# Patient Record
Sex: FEMALE | Race: ASIAN | NOT HISPANIC OR LATINO | URBAN - METROPOLITAN AREA
[De-identification: names, ages, dates, MRNs, and addresses within clinical notes are randomized per-mention and may not be internally consistent; named-entity substitution may affect disease eponyms.]

---

## 2022-11-01 ENCOUNTER — INPATIENT (INPATIENT)
Facility: HOSPITAL | Age: 72
LOS: 1 days | Discharge: HOME | DRG: 149 | End: 2022-11-03
Attending: INTERNAL MEDICINE | Admitting: INTERNAL MEDICINE
Payer: COMMERCIAL

## 2022-11-01 VITALS
TEMPERATURE: 98 F | RESPIRATION RATE: 19 BRPM | WEIGHT: 119.93 LBS | OXYGEN SATURATION: 98 % | HEIGHT: 63 IN | HEART RATE: 71 BPM | SYSTOLIC BLOOD PRESSURE: 140 MMHG | DIASTOLIC BLOOD PRESSURE: 83 MMHG

## 2022-11-01 DIAGNOSIS — Z90.49 ACQUIRED ABSENCE OF OTHER SPECIFIED PARTS OF DIGESTIVE TRACT: Chronic | ICD-10-CM

## 2022-11-01 LAB
ALBUMIN SERPL ELPH-MCNC: 4.5 G/DL — SIGNIFICANT CHANGE UP (ref 3.3–5)
ALP SERPL-CCNC: 87 U/L — SIGNIFICANT CHANGE UP (ref 40–120)
ALT FLD-CCNC: 12 U/L — SIGNIFICANT CHANGE UP (ref 10–45)
ANION GAP SERPL CALC-SCNC: 10 MMOL/L — SIGNIFICANT CHANGE UP (ref 5–17)
APPEARANCE UR: CLEAR — SIGNIFICANT CHANGE UP
APTT BLD: 28.6 SEC — SIGNIFICANT CHANGE UP (ref 27.5–35.5)
AST SERPL-CCNC: 19 U/L — SIGNIFICANT CHANGE UP (ref 10–40)
BACTERIA # UR AUTO: NEGATIVE — SIGNIFICANT CHANGE UP
BASOPHILS # BLD AUTO: 0.03 K/UL — SIGNIFICANT CHANGE UP (ref 0–0.2)
BASOPHILS NFR BLD AUTO: 0.6 % — SIGNIFICANT CHANGE UP (ref 0–2)
BILIRUB SERPL-MCNC: 0.4 MG/DL — SIGNIFICANT CHANGE UP (ref 0.2–1.2)
BILIRUB UR-MCNC: NEGATIVE — SIGNIFICANT CHANGE UP
BUN SERPL-MCNC: 14 MG/DL — SIGNIFICANT CHANGE UP (ref 7–23)
CALCIUM SERPL-MCNC: 9.7 MG/DL — SIGNIFICANT CHANGE UP (ref 8.4–10.5)
CHLORIDE SERPL-SCNC: 105 MMOL/L — SIGNIFICANT CHANGE UP (ref 96–108)
CO2 SERPL-SCNC: 28 MMOL/L — SIGNIFICANT CHANGE UP (ref 22–31)
COLOR SPEC: COLORLESS — SIGNIFICANT CHANGE UP
CREAT SERPL-MCNC: 0.6 MG/DL — SIGNIFICANT CHANGE UP (ref 0.5–1.3)
DIFF PNL FLD: NEGATIVE — SIGNIFICANT CHANGE UP
EGFR: 96 ML/MIN/1.73M2 — SIGNIFICANT CHANGE UP
EOSINOPHIL # BLD AUTO: 0.05 K/UL — SIGNIFICANT CHANGE UP (ref 0–0.5)
EOSINOPHIL NFR BLD AUTO: 1 % — SIGNIFICANT CHANGE UP (ref 0–6)
EPI CELLS # UR: 0 /HPF — SIGNIFICANT CHANGE UP
FLUAV AG NPH QL: SIGNIFICANT CHANGE UP
FLUBV AG NPH QL: SIGNIFICANT CHANGE UP
GLUCOSE SERPL-MCNC: 98 MG/DL — SIGNIFICANT CHANGE UP (ref 70–99)
GLUCOSE UR QL: NEGATIVE — SIGNIFICANT CHANGE UP
HCT VFR BLD CALC: 40.1 % — SIGNIFICANT CHANGE UP (ref 34.5–45)
HGB BLD-MCNC: 12.7 G/DL — SIGNIFICANT CHANGE UP (ref 11.5–15.5)
HYALINE CASTS # UR AUTO: 0 /LPF — SIGNIFICANT CHANGE UP (ref 0–2)
IMM GRANULOCYTES NFR BLD AUTO: 0.4 % — SIGNIFICANT CHANGE UP (ref 0–0.9)
INR BLD: 0.97 RATIO — SIGNIFICANT CHANGE UP (ref 0.88–1.16)
KETONES UR-MCNC: NEGATIVE — SIGNIFICANT CHANGE UP
LEUKOCYTE ESTERASE UR-ACNC: NEGATIVE — SIGNIFICANT CHANGE UP
LYMPHOCYTES # BLD AUTO: 1.11 K/UL — SIGNIFICANT CHANGE UP (ref 1–3.3)
LYMPHOCYTES # BLD AUTO: 22.6 % — SIGNIFICANT CHANGE UP (ref 13–44)
MCHC RBC-ENTMCNC: 31.4 PG — SIGNIFICANT CHANGE UP (ref 27–34)
MCHC RBC-ENTMCNC: 31.7 GM/DL — LOW (ref 32–36)
MCV RBC AUTO: 99.3 FL — SIGNIFICANT CHANGE UP (ref 80–100)
MONOCYTES # BLD AUTO: 0.34 K/UL — SIGNIFICANT CHANGE UP (ref 0–0.9)
MONOCYTES NFR BLD AUTO: 6.9 % — SIGNIFICANT CHANGE UP (ref 2–14)
NEUTROPHILS # BLD AUTO: 3.36 K/UL — SIGNIFICANT CHANGE UP (ref 1.8–7.4)
NEUTROPHILS NFR BLD AUTO: 68.5 % — SIGNIFICANT CHANGE UP (ref 43–77)
NITRITE UR-MCNC: NEGATIVE — SIGNIFICANT CHANGE UP
NRBC # BLD: 0 /100 WBCS — SIGNIFICANT CHANGE UP (ref 0–0)
PH UR: 7 — SIGNIFICANT CHANGE UP (ref 5–8)
PLATELET # BLD AUTO: 135 K/UL — LOW (ref 150–400)
POTASSIUM SERPL-MCNC: 4.7 MMOL/L — SIGNIFICANT CHANGE UP (ref 3.5–5.3)
POTASSIUM SERPL-SCNC: 4.7 MMOL/L — SIGNIFICANT CHANGE UP (ref 3.5–5.3)
PROT SERPL-MCNC: 7.4 G/DL — SIGNIFICANT CHANGE UP (ref 6–8.3)
PROT UR-MCNC: NEGATIVE — SIGNIFICANT CHANGE UP
PROTHROM AB SERPL-ACNC: 11.2 SEC — SIGNIFICANT CHANGE UP (ref 10.5–13.4)
RBC # BLD: 4.04 M/UL — SIGNIFICANT CHANGE UP (ref 3.8–5.2)
RBC # FLD: 12.5 % — SIGNIFICANT CHANGE UP (ref 10.3–14.5)
RBC CASTS # UR COMP ASSIST: 2 /HPF — SIGNIFICANT CHANGE UP (ref 0–4)
RSV RNA NPH QL NAA+NON-PROBE: SIGNIFICANT CHANGE UP
SARS-COV-2 RNA SPEC QL NAA+PROBE: SIGNIFICANT CHANGE UP
SODIUM SERPL-SCNC: 143 MMOL/L — SIGNIFICANT CHANGE UP (ref 135–145)
SP GR SPEC: 1.04 — HIGH (ref 1.01–1.02)
TROPONIN T, HIGH SENSITIVITY RESULT: 11 NG/L — SIGNIFICANT CHANGE UP (ref 0–51)
UROBILINOGEN FLD QL: NEGATIVE — SIGNIFICANT CHANGE UP
WBC # BLD: 4.91 K/UL — SIGNIFICANT CHANGE UP (ref 3.8–10.5)
WBC # FLD AUTO: 4.91 K/UL — SIGNIFICANT CHANGE UP (ref 3.8–10.5)
WBC UR QL: 0 /HPF — SIGNIFICANT CHANGE UP (ref 0–5)

## 2022-11-01 PROCEDURE — 70496 CT ANGIOGRAPHY HEAD: CPT | Mod: 26,MA

## 2022-11-01 PROCEDURE — 99220: CPT

## 2022-11-01 PROCEDURE — 70498 CT ANGIOGRAPHY NECK: CPT | Mod: 26,MA

## 2022-11-01 PROCEDURE — 70553 MRI BRAIN STEM W/O & W/DYE: CPT | Mod: 26,MC

## 2022-11-01 PROCEDURE — 93010 ELECTROCARDIOGRAM REPORT: CPT

## 2022-11-01 PROCEDURE — 70546 MR ANGIOGRAPH HEAD W/O&W/DYE: CPT | Mod: 26,59,MC

## 2022-11-01 RX ORDER — METOCLOPRAMIDE HCL 10 MG
10 TABLET ORAL ONCE
Refills: 0 | Status: COMPLETED | OUTPATIENT
Start: 2022-11-01 | End: 2022-11-01

## 2022-11-01 RX ORDER — SODIUM CHLORIDE 9 MG/ML
1000 INJECTION INTRAMUSCULAR; INTRAVENOUS; SUBCUTANEOUS ONCE
Refills: 0 | Status: COMPLETED | OUTPATIENT
Start: 2022-11-01 | End: 2022-11-01

## 2022-11-01 RX ORDER — PANTOPRAZOLE SODIUM 20 MG/1
40 TABLET, DELAYED RELEASE ORAL ONCE
Refills: 0 | Status: COMPLETED | OUTPATIENT
Start: 2022-11-01 | End: 2022-11-01

## 2022-11-01 RX ORDER — DIAZEPAM 5 MG
5 TABLET ORAL ONCE
Refills: 0 | Status: DISCONTINUED | OUTPATIENT
Start: 2022-11-01 | End: 2022-11-01

## 2022-11-01 RX ORDER — ONDANSETRON 8 MG/1
4 TABLET, FILM COATED ORAL ONCE
Refills: 0 | Status: COMPLETED | OUTPATIENT
Start: 2022-11-01 | End: 2022-11-01

## 2022-11-01 RX ORDER — MECLIZINE HCL 12.5 MG
25 TABLET ORAL EVERY 6 HOURS
Refills: 0 | Status: DISCONTINUED | OUTPATIENT
Start: 2022-11-01 | End: 2022-11-03

## 2022-11-01 RX ORDER — LEVOTHYROXINE SODIUM 125 MCG
75 TABLET ORAL DAILY
Refills: 0 | Status: DISCONTINUED | OUTPATIENT
Start: 2022-11-01 | End: 2022-11-03

## 2022-11-01 RX ADMIN — Medication 10 MILLIGRAM(S): at 08:01

## 2022-11-01 RX ADMIN — PANTOPRAZOLE SODIUM 40 MILLIGRAM(S): 20 TABLET, DELAYED RELEASE ORAL at 23:44

## 2022-11-01 RX ADMIN — Medication 25 MILLIGRAM(S): at 23:44

## 2022-11-01 RX ADMIN — ONDANSETRON 4 MILLIGRAM(S): 8 TABLET, FILM COATED ORAL at 09:56

## 2022-11-01 RX ADMIN — SODIUM CHLORIDE 1000 MILLILITER(S): 9 INJECTION INTRAMUSCULAR; INTRAVENOUS; SUBCUTANEOUS at 11:15

## 2022-11-01 RX ADMIN — Medication 30 MILLILITER(S): at 23:44

## 2022-11-01 RX ADMIN — SODIUM CHLORIDE 1000 MILLILITER(S): 9 INJECTION INTRAMUSCULAR; INTRAVENOUS; SUBCUTANEOUS at 12:22

## 2022-11-01 RX ADMIN — Medication 5 MILLIGRAM(S): at 10:01

## 2022-11-01 NOTE — ED PROVIDER NOTE - OBJECTIVE STATEMENT
71yo F pmhx of asthma, gastric ulcer, hypothyroidism, vertigo comes to ED w/ dizziness. From home felt dizzy fell to floor around 645am, patient lost consciousness, patient unsure how long she was down,  found her and called 911. Their pain/symptom is moderate, constant, mediating with rest, worse with standing up, associated with L arm coordination changes, drooling from L side of mouth, and L face numbness, initially had milder versions of symptoms since 10/25 went to the hospital previously for symptoms and was sceduled for an outpatient MRI to evaluate for stroke which she has not done yet but worsened today prompting visit to ED. Started randomly.     [id number] Yara ([place]ian)

## 2022-11-01 NOTE — CONSULT NOTE ADULT - ATTENDING COMMENTS
seen in ED  Briefly    72y R-H woman with  asthma , BPPV and hypothyroidism presented with ten days of room spinning dizziness.    CTH no infarct  CTA H/N question of venous thrombosis  MRI/V unremarkable. venous system and arterial system unremarkable.  R mastoid effusion noted     IMPRESSION   vertigo 2/2 R mastoid effusion      RECOMMENDATION  - mecizline PRN  - ENT, may need abx?  - no further stroke workup needed  - vestibular therapy  - outpatient f/u in office    - check FS, glucose control <180  - GI/DVT ppx  - Counseling on diet, exercise, and medication adherence was done  - Counseling on smoking cessation and alcohol consumption offered when appropriate.  - Pain assessed and judicious use of narcotics when appropriate was discussed.    - Stroke education given when appropriate.  - Importance of fall prevention discussed.   - Differential diagnosis and plan of care discussed with patient and/or family and primary team  - Thank you for allowing me to participate in the care of this patient. Call with questions.   - spoke with daughter and CDU team  Lauro Story MD  Vascular Neurology  Office: [phone] seen in ED  Briefly    72y R-H woman with  asthma , BPPV and hypothyroidism presented with ten days of room spinning dizziness.    CTH no infarct  CTA H/N question of venous thrombosis  MRI/V unremarkable. venous system and arterial system unremarkable.  R mastoid effusion noted     IMPRESSION   vertigo 2/2 R mastoid effusion      RECOMMENDATION  - mecizline PRN  - ENT, may need abx?  - no further stroke workup needed  - vestibular therapy, PT  - outpatient f/u in office    - check FS, glucose control <180  - GI/DVT ppx  - Counseling on diet, exercise, and medication adherence was done  - Counseling on smoking cessation and alcohol consumption offered when appropriate.  - Pain assessed and judicious use of narcotics when appropriate was discussed.    - Stroke education given when appropriate.  - Importance of fall prevention discussed.   - Differential diagnosis and plan of care discussed with patient and/or family and primary team  - Thank you for allowing me to participate in the care of this patient. Call with questions.   - spoke with daughter and CDU team  Lauro Story MD  Vascular Neurology  Office: [phone]

## 2022-11-01 NOTE — CONSULT NOTE ADULT - ASSESSMENT
ASSESSMENT     IMPRESSION     RECOMMENDATION  ASSESSMENT     72y R-H woman with a PMHx significant for asthma and hypothyroidism presented with ten days of room spinning dizziness. Due to suspicion of stroke was scheduled for an MRI of her head outpatient but experienced another bout of room spinning dizziness this morning and henceforth was BIBEMS. CT scan demonstrating nonenhancement of superior sagittal sinus and enlarged inferior sagittal sinus. Neurology consulted for stroke workup.     IMPRESSION   Left sided ataxic-hemiparesis with concurrent room spinning dizziness localizing to right brain dysfunction etiology can be broad such as central or peripheral vertigo vs ischemic stroke of unknown mechanism      RECOMMENDATION  [] CDU   #Stroke rule out   [] HgbA1C, fasting lipid panel, CBC, CMP, coag panel, troponin  [] MRI brain w/o con, MRv head/neck w/o contrast  [] TTE w/ bubble study  [] telemetry to check for arrhythmia, EKG, will discuss loop recorder    - Tight glucose control (long-term goal HgbA1c < 6%)  - Stroke education and counseling  - Neuro-checks and VS q4h  - Permissive HTN up to 220/120 for 24-48h from symptom onset  - Dysphagia screen. If fails, speech/swallow eval  - aspiration, fall precautions  - STAT CT head non-contrast for change in neuro exam.   - PT/ OT / DVT ppx per primary team     #Vertigo  [x] f/u CT/CTA   [] BP measurements in both arms + orthostatic VS  [] consider IVFs  [] Meclizine 12.5mg BID PRN (at most increase to 25mg Q8)  [] Refer for Vestibular Rehab on discharge      Discussed with stroke fellow Dimas Ramirez and under supervision of attending Alannah Coe regarding decision against candidacy for tPA/ thrombectomy. Will be formally staffed on morning rounds with attending. Recommendations will be complete once signed by attending.  ASSESSMENT     72y R-H woman with a PMHx significant for asthma and hypothyroidism presented with ten days of room spinning dizziness. Due to suspicion of stroke was scheduled for an MRI of her head outpatient but experienced another bout of room spinning dizziness this morning and henceforth was BIBEMS. CT scan demonstrating nonenhancement of superior sagittal sinus and enlarged inferior sagittal sinus. Neurology consulted for stroke workup.     IMPRESSION   Left sided ataxic-hemiparesis with concurrent room spinning dizziness localizing to right brain dysfunction etiology can be broad such as central or peripheral vertigo vs ischemic stroke of unknown mechanism      RECOMMENDATION  [] CDU   #Stroke rule out   [] HgbA1C, fasting lipid panel, CBC, CMP, coag panel, troponin  [] Gadolinium-enhanced MR head w/wo contrast /MRV w/wo contrast     #Vertigo  [x] f/u CT/CTA   [] BP measurements in both arms + orthostatic VS  [] consider IVFs  [] Meclizine 12.5mg BID PRN (at most increase to 25mg Q8)  [] Refer for Vestibular Rehab on discharge    Discussed with stroke fellow Dimas Ramirez and under supervision of attending Alannah Coe regarding decision against candidacy for tPA/ thrombectomy. Will be formally staffed on morning rounds with attending. Recommendations will be complete once signed by attending.

## 2022-11-01 NOTE — ED CDU PROVIDER INITIAL DAY NOTE - PROGRESS NOTE DETAILS
discussed CT findings of mastoid effusion and ?otitis w/ attending Dr. Kulkarni, would not treat at this time. TMs do not appear infectious on exam. - CARLO FariaC Patient seen at bedside in NAD.  VSS.  Patient resting comfortably without complaints. No events on tele. No interval changes from previous CDU note. Exam still w/ 4+/5 strength LUE/LLE compared to right. Faint L sided facial droop. Denies new sxs. Awaiting MRI. - Dami Whitehead PA-C

## 2022-11-01 NOTE — ED ADULT NURSE REASSESSMENT NOTE - NS ED NURSE REASSESS COMMENT FT1
16.00 Received the Pt from  TASHI Cortes. Pt is Observed for Dizziness for MRI  . Received the Pt A&OX 4 obeys commands Juanita N/V/D fever chills cp SOB   Comfort care & safety measures continued  IV site looks clean & dry no signs of infiltration noted pt denies  pain IV site .  Pt is advised to call for help  call bell with in the reach pt verbalized the understanding .  pending CDU  MD troy . GCS 15/15 A&OX 4 PERRLA  size 3 Strong upper & lower extremities steady gait   No facial droop  No Hand Leg drop denies numbness tingling Continue to monitor

## 2022-11-01 NOTE — CONSULT NOTE ADULT - SUBJECTIVE AND OBJECTIVE BOX
Neurology - Consult Note    -  Spectra: [phone] ([facility]), [phone] ([facility])  -    HPI: Patient JOSE RAFAEL PADNA is a 72y (1950) wo/man with a PMHx significant for ***      Review of Systems:  INCOMPLETE   CONSTITUTIONAL: No fevers or chills  EYES AND ENT: No visual changes or no throat pain   NECK: No pain or stiffness  RESPIRATORY: No hemoptysis or shortness of breath  CARDIOVASCULAR: No chest pain or palpitations  GASTROINTESTINAL: No melena or hematochezia  GENITOURINARY: No dysuria or hematuria  NEUROLOGICAL: +As stated in HPI above  SKIN: No itching, burning, rashes, or lesions   All other review of systems is negative unless indicated above.    Allergies:      PMHx/PSHx/Family Hx: As above, otherwise see below   Hypothyroidism        Social Hx:  No current use of tobacco, alcohol, or illicit drugs  Lives with ***    Medications:  MEDICATIONS  (STANDING):    MEDICATIONS  (PRN):      Vitals:  T(C): 36.4 (11-01-22 @ 11:00), Max: 36.7 (11-01-22 @ 07:30)  HR: 64 (11-01-22 @ 11:23) (64 - 71)  BP: 94/58 (11-01-22 @ 11:23) (89/62 - 140/83)  RR: 16 (11-01-22 @ 11:23) (16 - 19)  SpO2: 95% (11-01-22 @ 11:23) (95% - 98%)    Physical Examination: INCOMPLETE  General - NAD, pleasant, cooperative   Cardiovascular - Peripheral pulses palpable, no edema  Neurologic Exam:  Mental status - Awake, Alert, Oriented to person, place, and time. Speech fluent, repetition and naming intact. Follows simple and complex commands. Attention/concentration, recent and remote memory (including registration and recall), and fund of knowledge intact    Cranial nerves:  CN II: Visual fields are full to confrontation. Fundoscopic exam is normal with sharp discs. Pupils are 4 mm and briskly reactive to light. Visual acuity is 20/20 bilaterally.  CN III, IV, VI: EOMI, no nystagmus, no ptosis  CN V: Facial sensation is intact to pinprick in all 3 divisions bilaterally.  CN VII: Face is symmetric with normal eye closure and smile.  CN VII: Hearing is normal to rubbing fingers  CN IX, X: Palate elevates symmetrically. Phonation is normal.  CN XI: Head turning and shoulder shrug are intact  CN XII: Tongue is midline with normal movements and no atrophy.    Motor - Normal bulk and tone throughout. No pronator drift of out-stretched arms.  Strength testing            Deltoid      Biceps      Triceps     Wrist Extension    Wrist Flexion     Interossei         R            5                 5               5                     5                              5                        5                 5  L             5                 5               5                     5                              5                        5                 5              Hip Flexion    Hip Extension    Knee Flexion    Knee Extension    Dorsiflexion    Plantar Flexion  R              5                           5                       5                           5                            5                          5  L              5                           5                        5                           5                            5                          5    Sensation - Light touch/temperature OR pain/vibration intact in fingers and toes     DTR's -             Biceps      Triceps     Brachioradialis      Patellar    Ankle    Toes/plantar response  R             2+             2+                  2+                       2+            2+                 Down  L              2+             2+                 2+                        2+           2+                 Down    Coordination - Rapid alternating movements and fine finger movements are intact. There is no dysmetria on finger-to-nose and heel-knee-shin. There are no abnormal or extraneous movements. Romberg?    Gait and station - Posture is normal. Gait is steady with normal steps, base, arm swing, and turning. Heel and toe walking are normal. Tandem gait is normal     Labs:                        12.7   4.91  )-----------( 135      ( 01 Nov 2022 08:27 )             40.1     11-01    143  |  105  |  14  ----------------------------<  98  4.7   |  28  |  0.60    Ca    9.7      01 Nov 2022 08:27    TPro  7.4  /  Alb  4.5  /  TBili  0.4  /  DBili  x   /  AST  19  /  ALT  12  /  AlkPhos  87  11-01    CAPILLARY BLOOD GLUCOSE      POCT Blood Glucose.: 90 mg/dL (01 Nov 2022 11:20)    LIVER FUNCTIONS - ( 01 Nov 2022 08:27 )  Alb: 4.5 g/dL / Pro: 7.4 g/dL / ALK PHOS: 87 U/L / ALT: 12 U/L / AST: 19 U/L / GGT: x             PT/INR - ( 01 Nov 2022 08:27 )   PT: 11.2 sec;   INR: 0.97 ratio         PTT - ( 01 Nov 2022 08:27 )  PTT:28.6 sec  CSF:                  Radiology:  CT Head No Cont:  (01 Nov 2022 08:54)     Neurology - Consult Note    -  Spectra: [phone] ([facility]), [phone] ([facility])  -    HPI: Patient JOSE RAFAEL PANDA is a R-H 72y (1950) woman with a PMHx significant for asthma and hypothyroidism presented with ten days of room spinning dizziness. She awoke with dizziness and went to a [facility] for treatment. She was given meclizine but after two days, did not have any relief of her dizziness. 5 days ago, she went back to Urgent care and was told that she may have had a stroke. She was recommended to schedule an MRI of her head outpatient but experienced another bout of room spinning dizziness this morning and henceforth was BIBEMS.     In describing the dizziness, the patient said that they were more intense when she were to lie down and would be associated with nausea and vomiting. If she were to be upright and sitting, the symptoms would go away. While the symptoms would be on and off, she believes that the dizziness is more intense at night. She started getting a mild headache two days ago localized in the left side of her head. She also said that a few days ago, she experienced water leaking out of the left side of the mouth and feels weaker on the left side of her body, particularly when she is clenching her fist. Had a history of gallbladder removal and she takes synthroid for "low blood pressure." Normally walks for 30 min a day. Denies recent close contacts, travel.      NIHSS: 3   mRS: 0  No tPA indicated because outside tPA window   No thrombectomy indicated because no LVO found     Review of Systems:    RESPIRATORY: No shortness of breath  CARDS: No chest palpitations   NEUROLOGICAL: +As stated in HPI above  All other review of systems is negative unless indicated above.    Allergies:      PMHx/PSHx/Family Hx: As above, otherwise see below   Hypothyroidism        Social Hx:  No current use of tobacco, alcohol, or illicit drugs      Medications:  MEDICATIONS  (STANDING):    MEDICATIONS  (PRN):      Vitals:  T(C): 36.4 (11-01-22 @ 11:00), Max: 36.7 (11-01-22 @ 07:30)  HR: 64 (11-01-22 @ 11:23) (64 - 71)  BP: 94/58 (11-01-22 @ 11:23) (89/62 - 140/83)  RR: 16 (11-01-22 @ 11:23) (16 - 19)  SpO2: 95% (11-01-22 @ 11:23) (95% - 98%)    Physical Examination:   General - NAD, pleasant, cooperative   Cardiovascular - Peripheral pulses palpable, no edema  Neurologic Exam:  Mental status - Awake, Alert, Oriented to person, place, and time. Speech fluent, repetition and naming intact. Follows simple and complex commands. Attention/concentration, recent and remote memory (including registration and recall), and fund of knowledge intact    Cranial nerves:  CN II: Visual fields are full to confrontation. Pupils are 3mm and briskly reactive to light.   CN III, IV, VI: EOMI, no nystagmus, no ptosis, negative test of skew, positive head impulse    CN V: Facial sensation is intact to pinprick in all 3 divisions bilaterally.  CN VII: Mild left facial droop upon smile   CN VII: Hearing is normal to rubbing fingers  CN IX, X: Palate elevates symmetrically. Phonation is normal.  CN XI: Head turning and shoulder shrug are intact  CN XII: Tongue is midline with normal movements and no atrophy.    Motor - Normal bulk and tone throughout. No pronator drift of out-stretched arms.  Strength testing            Deltoid      Biceps      Triceps     Wrist Extension    Wrist Flexion     Interossei         R            5                 5               5                     5                              5                        5                 5  L             4+               4+            4-                    4-                            4-                         5                 4+              Hip Flexion    Hip Extension    Knee Flexion    Knee Extension    Dorsiflexion    Plantar Flexion  R              5                        -                         4+                           4+                            5                          5  L              4+                      -                          4+                           4-                           4+                          4+    Sensation - Light touch intact in fingers and toes     DTR's -             Biceps      Triceps     Brachioradialis      Patellar    Ankle    Toes/plantar response  R             2+             2+                  2+                       2+            2+                 Down  L              2+             2+                 2+                        2+           2+                 Down    Coordination - There is no dysmetria on finger-to-nose on right but with dysmetria on the left. There are no abnormal or extraneous movements. Positive romberg     Gait and station - Cautious wide based gait with small stride length with retropulsion    Labs:                        12.7   4.91  )-----------( 135      ( 01 Nov 2022 08:27 )             40.1     11-01    143  |  105  |  14  ----------------------------<  98  4.7   |  28  |  0.60    Ca    9.7      01 Nov 2022 08:27    TPro  7.4  /  Alb  4.5  /  TBili  0.4  /  DBili  x   /  AST  19  /  ALT  12  /  AlkPhos  87  11-01    CAPILLARY BLOOD GLUCOSE      POCT Blood Glucose.: 90 mg/dL (01 Nov 2022 11:20)    LIVER FUNCTIONS - ( 01 Nov 2022 08:27 )  Alb: 4.5 g/dL / Pro: 7.4 g/dL / ALK PHOS: 87 U/L / ALT: 12 U/L / AST: 19 U/L / GGT: x             PT/INR - ( 01 Nov 2022 08:27 )   PT: 11.2 sec;   INR: 0.97 ratio         PTT - ( 01 Nov 2022 08:27 )  PTT:28.6 sec  CSF:                  Radiology:  < from: CT Head No Cont (11.01.22 @ 08:54) >    1.  Brain:  Unremarkable CT appearance of the brain. Right mastoid   effusion and suspected otitis. Underdeveloped right petrous air cells    2.  Right carotid system:    No hemodynamically significant stenosis.    3   Left carotid system:     No hemodynamically significant stenosis.    4.   Vertebral circulation:    Patent.    5.  Anterior intracranial circulation:     Unremarkable.    6.  Posterior intracranial circulation:    Unremarkable.    7.  No large vessel occlusion.    8. Nonenhancement of the superior sagittal sinus anterior limb is found   in association with an enlarged inferior sagittal sinus. This may reflect   developmental variant versus acquired occlusion of the superior sagittal   sinus anterior limb, age indeterminate. Heterogeneous enhancement within   the right sigmoid sinus appears to be inflow/mixing phenomenon rather   than partial thrombosis. Arachnoidgranulation right mid transverse   sinus. Direct comparison to prior intracranial vascular imaging will   benefit this evaluation.  When these examinations become available, an   addendum will be provided as appropriate. Supplemental evaluation by   gadolinium-enhanced MR/MRV may be considered.    < end of copied text >

## 2022-11-01 NOTE — ED PROVIDER NOTE - NS ED ROS FT
Constitutional: no fevers, chills  HEENT: no cough, rhinorrhea  Cardiac: no chest pain, palpitations  Respiratory: no SOB  GI: no n/v, abd pain, bloody or dark stools  : no dysuria, frequency, or hematuria  MSK: no joint pain  Skin: no rashes  Neuro: dizziness, L arm sensation changes, drooling from L side of mouth, and L face numbness, headache. no change in vision  Psych: negative

## 2022-11-01 NOTE — ED ADULT NURSE NOTE - OBJECTIVE STATEMENT
Pt was BIBEMS from home s/p fall. PMH hypothyroidism, vertigo, gastric ulcer. As per pt she was feeling dizzy and vomiting went to  10/25 and last Friday was told she had mild stoke is schedule for a MRI on Thursday, today pt felt worse dizziness and vomiting try to call 911 and felt and +LOC  found her unknown time for LOC. Pt is OAx4. Breathing unlabored on RA symmetrical rise and fall of the chest. Abdomen is soft and nondistended. Skin is warm and dry to touch. Pt denies fever, chills, SOB, fever. On Stretcher at lowest position.

## 2022-11-01 NOTE — ED PROVIDER NOTE - CLINICAL SUMMARY MEDICAL DECISION MAKING FREE TEXT BOX
Impression: 73yo F pmhx of asthma, gastric ulcer, hypothyroidism, vertigo comes to ED w/ dizziness. Their symptoms of L arm coordination changes, drooling from L side of mouth, and L face numbness, exam findings of 3+motor LUE and LLE, Decreased LUE and LLE sensation are concerning for stroke. Stroke onset approximately at least 3 days prior.    Ordered labs, imaging, medications for diagnosis, management, and treatment.

## 2022-11-01 NOTE — ED ADULT NURSE REASSESSMENT NOTE - NS ED NURSE REASSESS COMMENT FT1
Received handoff report from TASHI Niño. pt is  currently hypotensive to 90s/60s, vitals otherwise stable. Orthostatics taken, pt hypotensive to 80s/50s when standing. Pt c/o dizziness still at this time. Pt requests to ambulate to the bathroom and requests food. Pt states she can walk, ambulates independently without assistance without difficulty, walker provided out of caution d/t dizziness. Pt tolerated ambulation well, returns to stretcher independently without difficulty. Food provided to pt, pt resting comfortable in stretcher drinking juice and eating food currently. MAI Morales made aware of pt status and vital signs, states he is OK with current BP as pt appears well and is eating/drinking currently. Pt transported to MRI.

## 2022-11-01 NOTE — ED PROVIDER NOTE - PHYSICAL EXAMINATION
General: NAD  HEENT: NCAT, PERRL  Cardiac: RRR, no murmurs, 2+ peripheral pulses  Chest: CTAB  Abdomen: soft, non-distended, bowel sounds present, no ttp, no rebound or guarding  Extremities: no peripheral edema, calf tenderness, or leg size discrepancies  Skin: no rashes  Neuro: AAOx4, 3+motor LUE and LLE, Decreased LUE and LLE sensation. 5+ motor RUE and RLE, normal R sided sensation.  Psych: mood and affect appropriate

## 2022-11-01 NOTE — ED CDU PROVIDER INITIAL DAY NOTE - OBJECTIVE STATEMENT
71 yo female pmhx asthma, hypothyroidism presents to the ED c/o room spinning dizziness x 10 days, worsened this morning. Early today around 645am began to feel room spinning dizziness which caused her to fall, unsure about LOC but thinks she did,  found her and called 911. Additionally c/o L side facial numbness, L side arm weakness and ?drooling from L side of mouth., Had similar sxs 10/25, went to OSH, scheduled to have outpt MRI to eval for stroke but hasn't gone yet. Denies speech/visual changes, hx previous stroke, cp, sob, palpitations, n/v/d.    In ED code stroke called upon arrival. CTH no acute infarct, CTA head/neck

## 2022-11-01 NOTE — ED ADULT NURSE REASSESSMENT NOTE - NS ED NURSE REASSESS COMMENT FT1
@1100am pt BP was, informed MD 89/62 informed MD PATEL ordered and started. Pt is OAx3 and following commands.

## 2022-11-01 NOTE — ED CDU PROVIDER INITIAL DAY NOTE - PHYSICAL EXAMINATION
General: NAD HEENT: NCAT, PERRL  HENT: No deformities. No mastoid tenderness or swelling. EAC clear, TMs visible, no erythema, bulging or swelling.   Cardiac: RRR, no murmurs, 2+ peripheral pulses  Resp: CTAB. No wheezing, rales or rhonchi.   Abdomen: soft, non-distended, bowel sounds present, no ttp, no rebound or guarding  Extremities: no peripheral edema, calf tenderness, or leg size discrepancies  Skin: no rashes  Neuro: AAOx4, 3+motor LUE and LLE, Decreased LUE and LLE sensation. 5+ motor RUE and RLE, normal R sided sensation.   Psych: mood and affect appropriate General: NAD HEENT: NCAT, PERRL  HENT: No deformities. No mastoid tenderness or swelling. EAC clear, TMs visible, no erythema, bulging or swelling.   Cardiac: RRR, no murmurs, 2+ peripheral pulses  Resp: CTAB. No wheezing, rales or rhonchi.   Abdomen: soft, non-distended, bowel sounds present, no ttp, no rebound or guarding  Extremities: no peripheral edema, calf tenderness, or leg size discrepancies  Skin: no rashes  Neuro: AAOx4, 3+motor LUE and LLE, Decreased LUE and LLE sensation. 5+ motor RUE and RLE, normal R sided sensation. Faint L side facial drool, otherwise CN intact.   Psych: mood and affect appropriate

## 2022-11-01 NOTE — ED CDU PROVIDER INITIAL DAY NOTE - ATTENDING APP SHARED VISIT CONTRIBUTION OF CARE
attending Cayla: pt with vertigo and L sided weakness. Plan for CDU for telemetry monitoring, neuro checks, MRI/MRV head w/wo contrast, neuro following, frequent reevaluations

## 2022-11-01 NOTE — ED CDU PROVIDER INITIAL DAY NOTE - DETAILS
Vertigo/L side weakness  -MRI/MRV head w/wo contrast  -Tele   -Neuro checks  Case d/w ED attending Dr. Kulkarni

## 2022-11-01 NOTE — ED PROVIDER NOTE - ATTENDING CONTRIBUTION TO CARE
attending Cayla: 72yF h/o asthma, gastric ulcer, hypothyroidism p/w dizziness. Reports room-spinning dizziness, worse with standing better with lying down since 10/25. Seen at urgent care last week and given meclizine with no improvement. Today with worsening dizziness and had a fall from standing with +LOC. Pt also notes 3 days of L sided drooling when drinking and L hand weakness/clumsiness. Exam as above. Concern for stroke. Will obtain ekg, place on tele, labs, CT imaging, neuro eval in ED

## 2022-11-02 DIAGNOSIS — H81.10 BENIGN PAROXYSMAL VERTIGO, UNSPECIFIED EAR: ICD-10-CM

## 2022-11-02 DIAGNOSIS — R42 DIZZINESS AND GIDDINESS: ICD-10-CM

## 2022-11-02 LAB
A1C WITH ESTIMATED AVERAGE GLUCOSE RESULT: 5.3 % — SIGNIFICANT CHANGE UP (ref 4–5.6)
CHOLEST SERPL-MCNC: 176 MG/DL — SIGNIFICANT CHANGE UP
ESTIMATED AVERAGE GLUCOSE: 105 MG/DL — SIGNIFICANT CHANGE UP (ref 68–114)
HDLC SERPL-MCNC: 58 MG/DL — SIGNIFICANT CHANGE UP
LIPID PNL WITH DIRECT LDL SERPL: 102 MG/DL — HIGH
NON HDL CHOLESTEROL: 118 MG/DL — SIGNIFICANT CHANGE UP
TRIGL SERPL-MCNC: 77 MG/DL — SIGNIFICANT CHANGE UP

## 2022-11-02 PROCEDURE — 99217: CPT

## 2022-11-02 PROCEDURE — 99222 1ST HOSP IP/OBS MODERATE 55: CPT | Mod: 25

## 2022-11-02 PROCEDURE — 95992 CANALITH REPOSITIONING PROC: CPT

## 2022-11-02 PROCEDURE — 31231 NASAL ENDOSCOPY DX: CPT

## 2022-11-02 RX ORDER — FLUTICASONE FUROATE AND VILANTEROL TRIFENATATE 100; 25 UG/1; UG/1
1 POWDER RESPIRATORY (INHALATION)
Qty: 0 | Refills: 0 | DISCHARGE

## 2022-11-02 RX ORDER — OMEPRAZOLE 10 MG/1
1 CAPSULE, DELAYED RELEASE ORAL
Qty: 0 | Refills: 0 | DISCHARGE

## 2022-11-02 RX ORDER — ZOLPIDEM TARTRATE 10 MG/1
1 TABLET ORAL
Qty: 0 | Refills: 0 | DISCHARGE

## 2022-11-02 RX ORDER — ZOLPIDEM TARTRATE 10 MG/1
5 TABLET ORAL AT BEDTIME
Refills: 0 | Status: DISCONTINUED | OUTPATIENT
Start: 2022-11-02 | End: 2022-11-03

## 2022-11-02 RX ORDER — ONDANSETRON 8 MG/1
4 TABLET, FILM COATED ORAL ONCE
Refills: 0 | Status: COMPLETED | OUTPATIENT
Start: 2022-11-02 | End: 2022-11-02

## 2022-11-02 RX ORDER — DICLOFENAC SODIUM 30 MG/G
1 GEL TOPICAL
Qty: 0 | Refills: 0 | DISCHARGE

## 2022-11-02 RX ORDER — DIAZEPAM 5 MG
5 TABLET ORAL ONCE
Refills: 0 | Status: DISCONTINUED | OUTPATIENT
Start: 2022-11-02 | End: 2022-11-02

## 2022-11-02 RX ORDER — MAGNESIUM OXIDE 400 MG ORAL TABLET 241.3 MG
1 TABLET ORAL
Qty: 0 | Refills: 0 | DISCHARGE

## 2022-11-02 RX ORDER — METOCLOPRAMIDE HCL 10 MG
10 TABLET ORAL ONCE
Refills: 0 | Status: COMPLETED | OUTPATIENT
Start: 2022-11-02 | End: 2022-11-02

## 2022-11-02 RX ORDER — LEVOTHYROXINE SODIUM 125 MCG
1 TABLET ORAL
Qty: 0 | Refills: 0 | DISCHARGE

## 2022-11-02 RX ORDER — PANTOPRAZOLE SODIUM 20 MG/1
40 TABLET, DELAYED RELEASE ORAL
Refills: 0 | Status: DISCONTINUED | OUTPATIENT
Start: 2022-11-02 | End: 2022-11-03

## 2022-11-02 RX ADMIN — Medication 10 MILLIGRAM(S): at 16:41

## 2022-11-02 RX ADMIN — Medication 25 MILLIGRAM(S): at 07:58

## 2022-11-02 RX ADMIN — Medication 30 MILLILITER(S): at 20:13

## 2022-11-02 RX ADMIN — ONDANSETRON 4 MILLIGRAM(S): 8 TABLET, FILM COATED ORAL at 08:02

## 2022-11-02 RX ADMIN — Medication 75 MICROGRAM(S): at 05:13

## 2022-11-02 RX ADMIN — Medication 5 MILLIGRAM(S): at 16:55

## 2022-11-02 NOTE — ED CDU PROVIDER DISPOSITION NOTE - CLINICAL COURSE
71 yo female pmhx asthma, hypothyroidism presents to the ED c/o room spinning dizziness x 10 days, worsened this morning. Early today around 645am began to feel room spinning dizziness which caused her to fall, unsure about LOC but thinks she did,  found her and called 911. Additionally c/o L side facial numbness, L side arm weakness and ?drooling from L side of mouth., Had similar sxs 10/25, went to OSH, scheduled to have outpt MRI to eval for stroke but hasn't gone yet. Denies speech/visual changes, hx previous stroke, cp, sob, palpitations, n/v/d.  In CDU, 73 yo female pmhx asthma, hypothyroidism presents to the ED c/o room spinning dizziness x 10 days, worsened this morning. Early today around 645am began to feel room spinning dizziness which caused her to fall, unsure about LOC but thinks she did,  found her and called 911. Additionally c/o L side facial numbness, L side arm weakness and ?drooling from L side of mouth., Had similar sxs 10/25, went to OSH, scheduled to have outpt MRI to eval for stroke but hasn't gone yet. Denies speech/visual changes, hx previous stroke, cp, sob, palpitations, n/v/d.  In CDU, pt with continued severe dizziness -initially improved after epleys maneuver but then worsened, unsteady, admitted to medicine for intractable dizziness

## 2022-11-02 NOTE — ED ADULT NURSE REASSESSMENT NOTE - NSIMPLEMENTINTERV_GEN_ALL_ED
Implemented All Fall with Harm Risk Interventions:  [place] to call system. Call bell, personal items and telephone within reach. Instruct patient to call for assistance. Room bathroom lighting operational. Non-slip footwear when patient is off stretcher. Physically safe environment: no spills, clutter or unnecessary equipment. Stretcher in lowest position, wheels locked, appropriate side rails in place. Provide visual cue, wrist band, yellow gown, etc. Monitor gait and stability. Monitor for mental status changes and reorient to person, place, and time. Review medications for side effects contributing to fall risk. Reinforce activity limits and safety measures with patient and family. Provide visual clues: red socks.
Implemented All Fall Risk Interventions:  [place] to call system. Call bell, personal items and telephone within reach. Instruct patient to call for assistance. Room bathroom lighting operational. Non-slip footwear when patient is off stretcher. Physically safe environment: no spills, clutter or unnecessary equipment. Stretcher in lowest position, wheels locked, appropriate side rails in place. Provide visual cue, wrist band, yellow gown, etc. Monitor gait and stability. Monitor for mental status changes and reorient to person, place, and time. Review medications for side effects contributing to fall risk. Reinforce activity limits and safety measures with patient and family.

## 2022-11-02 NOTE — ED CDU PROVIDER DISPOSITION NOTE - ADMIT DISPOSITION PRESENT ON ADMISSION SEPSIS
No What Type Of Note Output Would You Prefer (Optional)?: Standard Output How Severe Are Your Spot(S)?: mild Have Your Spot(S) Been Treated In The Past?: has not been treated Hpi Title: Evaluation of Skin Lesions Family Member: Daughter

## 2022-11-02 NOTE — CONSULT NOTE ADULT - PROBLEM SELECTOR RECOMMENDATION 9
- Mastoid effusion likely chronic and not infectious  - Will return to perform Epley maneuver later today to rule out BPPV  - ENT will continue to follow  - Call with questions or concerns - Mastoid effusion likely chronic and not infectious  - Meclizine prn   - Vestibular rehab   - Patient should follow up in ENT office as an outpatient. May see Dr. Gayle or Navin or Michael. Call [phone]

## 2022-11-02 NOTE — PHYSICAL THERAPY INITIAL EVALUATION ADULT - GENERAL OBSERVATIONS, REHAB EVAL
Pt received supine in bed, A&Ox4, +Lao speaking  Krys used ID#[id number],, agreeable to physical therapy gail troy 45 min.

## 2022-11-02 NOTE — H&P ADULT - ASSESSMENT
72y (1950) woman with a PMHx significant for asthma and hypothyroidism presented with ten days of room spinning dizziness. She awoke with dizziness and went to a [facility] for treatment. She was given meclizine but after two days, did not have any relief of her dizziness. 5 days ago, she went back to Urgent care and was told that she may have had a stroke. She was recommended to schedule an MRI of her head outpatient but experienced another bout of room spinning dizziness this morning and henceforth was BIBEMS    1 dizziness  - likely sec BPPV  - ENT and neuro fu   - cw meclizine  - fall precautions  - PT     2 Hypothyroid  - cw synthroid    PT and dc planing

## 2022-11-02 NOTE — ED CDU PROVIDER DISPOSITION NOTE - ATTENDING APP SHARED VISIT CONTRIBUTION OF CARE
Elmo De León MD:   I personally saw the patient and performed a substantive portion of the visit including all aspects of the medical decision making.    MDM: 72-year-old female with history of asthma, hypothyroidism who presents with vertiginous symptoms of room spinning dizziness for 10 days, but worsened this morning and resulted in a fall.  Patient also with left-sided numbness and weakness.  In the ED, stroke code was called, CT and CTA head/neck showed no acute infarcts, but there is abnormality of the sagittal sinus.  Patient was seen by neurology who recommended to place patient in CDU MRI, neurochecks, telemetry, frequent reassessment.  I saw the patient in the morning and she continued to have intermittent episodes of dizziness.  Neuro examination shows mild rightward beating horizontal nystagmus that is fatigable, and also has 4+ out of 5 strength in the left upper and left lower extremity.  Review of the MRI shows no acute infarct, but there is right mastoid effusion suggestive infectious versus inflammatory process.  MRV showed no evidence of dural sinus thrombosis.  ENT and neuro recommendations appreciated. Persistent symptoms despite multiple rounds of mediations and therapies. The patient will need to be admitted to the hospital for continued evaluation and management, as well as to optimize medical management and to provide outpatient needs assessment.  Discussed with the accepting physician regarding the initial presentation, diagnostic studies, treatments given in the ED, and current plan of care.   The patient was accepted by and endorsed to the medicine team.

## 2022-11-02 NOTE — ED CDU PROVIDER SUBSEQUENT DAY NOTE - PHYSICAL EXAMINATION
GEN: Pt non-toxic in NAD, alert.  PSYCH: Affect and mood appropriate.  EYES: Sclera white w/o injection, EOMI, PERRLA.   ENT: Neck supple FROM. Airway patent.  RESP: CTA b/l, no wheezes, rales, or rhonchi.   CARDIAC: RRR, clear distinct S1, S2, no appreciable murmurs.  ABD: Abdomen soft, non-tender.  MSK: Moving all extremities.  NEURO: No focal deficits. Decreased L hand  strength. LUE and LLE 4/5. RUE and RLE 5/5. Ambulatory with walker.  VASC: Radial pulses 2+ b/l. No edema or calf tenderness.  SKIN: No rashes or lesions.

## 2022-11-02 NOTE — H&P ADULT - NSHPPHYSICALEXAM_GEN_ALL_CORE
General: WN/WD NAD  PERRLA  Neurology: A&Ox3, nonfocal, BOGGS x 4  Respiratory: CTA B/L  CV: RRR, S1S2, no murmurs, rubs or gallops  Abdominal: Soft, NT, ND +BS, Last BM  Extremities: No edema, + peripheral pulses  Skin Normal

## 2022-11-02 NOTE — PHYSICAL THERAPY INITIAL EVALUATION ADULT - LEVEL OF INDEPENDENCE: GAIT, REHAB EVAL
amb 50ft without AD with CGA, LOBx3 with assist to correct. Pt amb an additional 50ft with RW with Sup progressing to independent. Pt would benefit using RW at this time.

## 2022-11-02 NOTE — PHYSICAL THERAPY INITIAL EVALUATION ADULT - STRENGTHENING, PT EVAL
GOAL: Pt will improve bilateral LE strength to 5/5, for increased limb stability, to improve gait and facilitate stair negotiation in 2 weeks. Number Of Freeze-Thaw Cycles: 1 freeze-thaw cycle Render Post-Care Instructions In Note?: yes Detail Level: Detailed Consent: The patient's consent was obtained including but not limited to risks of crusting, scabbing, blistering, scarring, darker or lighter pigmentary change, recurrence, incomplete removal and infection. Render Note In Bullet Format When Appropriate: No Duration Of Freeze Thaw-Cycle (Seconds): 0 Post-Care Instructions: I reviewed with the patient in detail post-care instructions. Patient is to wear sunprotection, and avoid picking at any of the treated lesions. Pt may apply Vaseline to crusted or scabbing areas. Spray Paint Text: The liquid nitrogen was applied to the skin utilizing a spray paint frosting technique. Medical Necessity Clause: This procedure was medically necessary because the lesions that were treated were: Medical Necessity Information: It is in your best interest to select a reason for this procedure from the list below. All of these items fulfill various CMS LCD requirements except the new and changing color options.

## 2022-11-02 NOTE — CONSULT NOTE ADULT - ASSESSMENT
72y woman with a PMHx significant for asthma and hypothyroidism presented with ten days of room spinning dizziness which lasts for approximately 5 min at a time. MRI head shows right mastoid effusion with contiguous involvement of the right middle ear cavity is associated with enhancement suggesting an active infectious or inflammatory process. On exam, right EAC with minimal cerumen which was removed with a curette and small amount of granulation tissue noted in right EAC, right TM noted to have a perforation without fluid drainage. [place]-hallpike performed which was positive on the right side with horizontal nystagmus and increased vertigo. Nasal endoscopy performed which was normal, B/L eustachian tubes widely patent.  72y woman with a PMHx significant for asthma and hypothyroidism presented with ten days of room spinning dizziness which lasts for approximately 5 min at a time. MRI head shows right mastoid effusion with contiguous involvement of the right middle ear cavity is associated with enhancement suggesting an active infectious or inflammatory process. On exam, right EAC with minimal cerumen which was removed with a curette and small amount of granulation tissue noted in right EAC, right TM noted to have a perforation without fluid drainage. [place]-hallpike performed which was positive on the right side with horizontal nystagmus and increased vertigo. Epley maneuver performed x3, minimal improvement in vertigo. Nasal endoscopy performed which was normal, B/L eustachian tubes widely patent.

## 2022-11-02 NOTE — PHYSICAL THERAPY INITIAL EVALUATION ADULT - PERTINENT HX OF CURRENT PROBLEM, REHAB EVAL
72y R-H woman with a PMHx significant for asthma and hypothyroidism presented with ten days of room spinning dizziness. Due to suspicion of stroke was scheduled for an MRI of her head outpatient but experienced another bout of room spinning dizziness this morning and henceforth was BIBEMS. CT scan demonstrating nonenhancement of superior sagittal sinus and enlarged inferior sagittal sinus. Neurology consulted for stroke workup. 1. MR BRAIN: Unremarkable MR of the brain. Ischemic white matter disease and atrophy lower range typical for age. No evidence of infarction. Right mastoid effusion with contiguous involvement of the right middle ear cavity is associated with enhancement suggesting an active infectious or inflammatory process. Underdeveloped right petrous air cells. 2.  MRV brain:   No evidence of dural sinus thrombosis. Superior sagittal sinus anterior limb is attenuated in caliber but uniform caliber and patent over an entire length. The inferior sagittal sinus is atypically well-developed and patent. This appears to represent  developmental variant anatomy. No pathologic flow or occlusion identified.

## 2022-11-02 NOTE — ED CDU PROVIDER DISPOSITION NOTE - NSFOLLOWUPINSTRUCTIONS_ED_ALL_ED_FT
1) Follow-up with your primary care provider in 1-2 days.      Follow-up with neurology within 1-2 weeks.    Lauro Story)  Neurology; Vascular Neurology  [address] [facility] [address], [address]  [place] [address]  Phone: [phone]  Fax: [phone]    2) Take aspirin 81mg daily. Take meclizine 25mg as prescribed for dizziness. Continue to take all medications as prescribed.    3) Rest and drink plenty of fluids. Pain can be managed with Acetaminophen (aka Tylenol) and Ibuprofen (aka Motrin or Advil) over the counter as directed. Take with food.    4) Return to the ER for any new or worsening symptoms.

## 2022-11-02 NOTE — CONSULT NOTE ADULT - SUBJECTIVE AND OBJECTIVE BOX
CC: dizziness, mastoid effusion     HPI: Patient JOSE RAFAEL PANDA is a R-H 72y (1950) woman with a PMHx significant for asthma and hypothyroidism presented with ten days of room spinning dizziness. She awoke with dizziness and went to a [facility] for treatment. She was given meclizine but after two days, did not have any relief of her dizziness. 5 days ago, she went back to Urgent care and was told that she may have had a stroke. She was recommended to schedule an MRI of her head outpatient but experienced another bout of room spinning dizziness this morning and henceforth was BIBEMS.     In describing the dizziness, the patient said that they were more intense when she were to lie down and would be associated with nausea and vomiting. If she were to be upright and sitting, the symptoms would go away. While the symptoms would be on and off, she believes that the dizziness is more intense at night. She started getting a mild headache two days ago localized in the left side of her head. She also said that a few days ago, she experienced water leaking out of the left side of the mouth and feels weaker on the left side of her body, particularly when she is clenching her fist. Had a history of gallbladder removal and she takes synthroid for "low blood pressure." Normally walks for 30 min a day. Denies recent close contacts, travel.      ENT consulted for right mastoid effusion noted on MRI and associated with dizziness. Pt states that the dizziness is room spinning and lasts for approximately 5 min at a time. Denies tinnitus, ear pain, congestion, recent URI, otorrhea, hearing loss, hx of sx or trauma or recent travel.       PAST MEDICAL & SURGICAL HISTORY:  Hypothyroidism      Asthma      S/P cholecystectomy        Allergies    No Known Allergies    Intolerances      MEDICATIONS  (STANDING):  levothyroxine 75 MICROGram(s) Oral daily    MEDICATIONS  (PRN):  aluminum hydroxide/magnesium hydroxide/simethicone Suspension 30 milliLiter(s) Oral every 6 hours PRN Dyspepsia  meclizine 25 milliGRAM(s) Oral every 6 hours PRN Dizziness      PMHx/PSHx/Family Hx: As above, otherwise see below   Hypothyroidism    Social Hx:  No current use of tobacco, alcohol, or illicit drugs      ROS:   ENT: all negative except as noted in HPI   CV: denies palpitations  Pulm: denies SOB, cough, hemoptysis  GI: denies change in appetite, indigestion, n/v  : denies pertinent urinary symptoms, urgency  Neuro: see hpi  Psych: denies anxiety  MS: denies muscle weakness, instability  Heme: denies easy bruising or bleeding  Endo: denies heat/cold intolerance, excessive sweating  Vascular: denies LE edema    Vital Signs Last 24 Hrs  T(C): 36.6 (02 Nov 2022 07:56), Max: 36.7 (01 Nov 2022 15:34)  T(F): 97.9 (02 Nov 2022 07:56), Max: 98 (01 Nov 2022 15:34)  HR: 67 (02 Nov 2022 12:50) (58 - 68)  BP: 99/61 (02 Nov 2022 12:50) (83/56 - 107/69)  BP(mean): 75 (02 Nov 2022 03:00) (65 - 75)  RR: 18 (02 Nov 2022 03:00) (16 - 19)  SpO2: 97% (02 Nov 2022 12:50) (94% - 97%)    Parameters below as of 02 Nov 2022 12:50  Patient On (Oxygen Delivery Method): room air                              12.7   4.91  )-----------( 135      ( 01 Nov 2022 08:27 )             40.1    11-01    143  |  105  |  14  ----------------------------<  98  4.7   |  28  |  0.60    Ca    9.7      01 Nov 2022 08:27    TPro  7.4  /  Alb  4.5  /  TBili  0.4  /  DBili  x   /  AST  19  /  ALT  12  /  AlkPhos  87  11-01   PT/INR - ( 01 Nov 2022 08:27 )   PT: 11.2 sec;   INR: 0.97 ratio         PTT - ( 01 Nov 2022 08:27 )  PTT:28.6 sec    PHYSICAL EXAM:  Gen: NAD  Skin: No rashes, bruises, or lesions  Head: Normocephalic, Atraumatic  Face: no edema, erythema, or fluctuance. Parotid glands soft without mass  Eyes: no scleral injection  Ears: Right - ear canal with minimal cerumen removed with curette, small amount of granulation tissue noted in EAC. TM perforation noted, no erythema. No evidence of any fluid drainage. No mastoid tenderness, erythema, or ear bulging            Left - ear canal clear, TM intact without effusion or erythema. No evidence of any fluid drainage. No mastoid tenderness, erythema, or ear bulging  Nose: Nares bilaterally patent, no discharge  Mouth: No Stridor / Drooling / Trismus.  Mucosa moist, tongue/uvula midline, oropharynx clear  Neck: Flat, supple, no lymphadenopathy, trachea midline, no masses  Lymphatic: No lymphadenopathy  Resp: breathing easily, no stridor  CV: no peripheral edema/cyanosis  GI: nondistended   Peripheral vascular: no JVD or edema  Neuro: facial nerve intact, no facial droop      Procedure: Overton-hallpike performed - +right sided horizontal nystagmus      Diagnostic Nasal Endoscopy  Indication for procedure: nasal congestion    "Anterior rhinoscopy insufficient to account for symptoms"    Verbal and/or written consent obtained from patient    Scope #3: flexible fiber optic telescope used with surgilube     no sigmoidal septal deviation noted. Mucosa moist with scant mucus, normal inferior/middle/superior turbinates, normal inferior/middle/superior meatus, normal fontanelles, maxillary ostia clear, sphenoethmoidal recess clear bilaterally. No polyps noted. B/L eustachian tubes widely patent.         IMAGING/ADDITIONAL STUDIES: MRI HEAD  IMPRESSION:     1. MR BRAIN: Unremarkable MR of the brain. Ischemic white matter disease and atrophy lower range typical for age. No evidence of infarction. Right mastoid effusion with contiguous involvement of the right middle ear cavity is associated with enhancement suggesting an active infectious or inflammatory process. Underdeveloped right petrous air cells.    2. MRV brain: No evidence of dural sinus thrombosis. Superior sagittal sinus anterior limb is attenuated in caliber but uniform caliber and patent over an entire length. The inferior sagittal sinus is atypically well-developed and patent. This appears to represent developmental variant anatomy. No pathologic flow or occlusion identified.    --- End of Report ---       CC: dizziness, mastoid effusion     HPI: Patient JOSE RAFAEL PANDA is a R-H 72y (1950) woman with a PMHx significant for asthma and hypothyroidism presented with ten days of room spinning dizziness. She awoke with dizziness and went to a [facility] for treatment. She was given meclizine but after two days, did not have any relief of her dizziness. 5 days ago, she went back to Urgent care and was told that she may have had a stroke. She was recommended to schedule an MRI of her head outpatient but experienced another bout of room spinning dizziness this morning and henceforth was BIBEMS.     In describing the dizziness, the patient said that they were more intense when she were to lie down and would be associated with nausea and vomiting. If she were to be upright and sitting, the symptoms would go away. While the symptoms would be on and off, she believes that the dizziness is more intense at night. She started getting a mild headache two days ago localized in the left side of her head. She also said that a few days ago, she experienced water leaking out of the left side of the mouth and feels weaker on the left side of her body, particularly when she is clenching her fist. Had a history of gallbladder removal and she takes synthroid for "low blood pressure." Normally walks for 30 min a day. Denies recent close contacts, travel.      ENT consulted for right mastoid effusion noted on MRI and associated with dizziness. Pt states that the dizziness is room spinning and lasts for approximately 5 min at a time. Denies tinnitus, ear pain, congestion, recent URI, otorrhea, hearing loss, hx of sx or trauma or recent travel.       PAST MEDICAL & SURGICAL HISTORY:  Hypothyroidism      Asthma      S/P cholecystectomy        Allergies    No Known Allergies    Intolerances      MEDICATIONS  (STANDING):  levothyroxine 75 MICROGram(s) Oral daily    MEDICATIONS  (PRN):  aluminum hydroxide/magnesium hydroxide/simethicone Suspension 30 milliLiter(s) Oral every 6 hours PRN Dyspepsia  meclizine 25 milliGRAM(s) Oral every 6 hours PRN Dizziness      PMHx/PSHx/Family Hx: As above, otherwise see below   Hypothyroidism    Social Hx:  No current use of tobacco, alcohol, or illicit drugs      ROS:   ENT: all negative except as noted in HPI   CV: denies palpitations  Pulm: denies SOB, cough, hemoptysis  GI: denies change in appetite, indigestion, n/v  : denies pertinent urinary symptoms, urgency  Neuro: see hpi  Psych: denies anxiety  MS: denies muscle weakness, instability  Heme: denies easy bruising or bleeding  Endo: denies heat/cold intolerance, excessive sweating  Vascular: denies LE edema    Vital Signs Last 24 Hrs  T(C): 36.6 (02 Nov 2022 07:56), Max: 36.7 (01 Nov 2022 15:34)  T(F): 97.9 (02 Nov 2022 07:56), Max: 98 (01 Nov 2022 15:34)  HR: 67 (02 Nov 2022 12:50) (58 - 68)  BP: 99/61 (02 Nov 2022 12:50) (83/56 - 107/69)  BP(mean): 75 (02 Nov 2022 03:00) (65 - 75)  RR: 18 (02 Nov 2022 03:00) (16 - 19)  SpO2: 97% (02 Nov 2022 12:50) (94% - 97%)    Parameters below as of 02 Nov 2022 12:50  Patient On (Oxygen Delivery Method): room air                              12.7   4.91  )-----------( 135      ( 01 Nov 2022 08:27 )             40.1    11-01    143  |  105  |  14  ----------------------------<  98  4.7   |  28  |  0.60    Ca    9.7      01 Nov 2022 08:27    TPro  7.4  /  Alb  4.5  /  TBili  0.4  /  DBili  x   /  AST  19  /  ALT  12  /  AlkPhos  87  11-01   PT/INR - ( 01 Nov 2022 08:27 )   PT: 11.2 sec;   INR: 0.97 ratio         PTT - ( 01 Nov 2022 08:27 )  PTT:28.6 sec    PHYSICAL EXAM:  Gen: NAD  Skin: No rashes, bruises, or lesions  Head: Normocephalic, Atraumatic  Face: no edema, erythema, or fluctuance. Parotid glands soft without mass  Eyes: no scleral injection  Ears: Right - ear canal with minimal cerumen removed with curette, small amount of granulation tissue noted in EAC. TM perforation noted, no erythema. No evidence of any fluid drainage. No mastoid tenderness, erythema, or ear bulging            Left - ear canal clear, TM intact without effusion or erythema. No evidence of any fluid drainage. No mastoid tenderness, erythema, or ear bulging  Nose: Nares bilaterally patent, no discharge  Mouth: No Stridor / Drooling / Trismus.  Mucosa moist, tongue/uvula midline, oropharynx clear  Neck: Flat, supple, no lymphadenopathy, trachea midline, no masses  Lymphatic: No lymphadenopathy  Resp: breathing easily, no stridor  CV: no peripheral edema/cyanosis  GI: nondistended   Peripheral vascular: no JVD or edema  Neuro: facial nerve intact, no facial droop      Procedure:   Candido-hallpike performed - +right sided horizontal nystagmus  Epley maneuver performed x3 - minimal improvement in vertigo      Diagnostic Nasal Endoscopy  Indication for procedure: nasal congestion    "Anterior rhinoscopy insufficient to account for symptoms"    Verbal and/or written consent obtained from patient    Scope #3: flexible fiber optic telescope used with surgilube     no sigmoidal septal deviation noted. Mucosa moist with scant mucus, normal inferior/middle/superior turbinates, normal inferior/middle/superior meatus, normal fontanelles, maxillary ostia clear, sphenoethmoidal recess clear bilaterally. No polyps noted. B/L eustachian tubes widely patent.         IMAGING/ADDITIONAL STUDIES: MRI HEAD  IMPRESSION:     1. MR BRAIN: Unremarkable MR of the brain. Ischemic white matter disease and atrophy lower range typical for age. No evidence of infarction. Right mastoid effusion with contiguous involvement of the right middle ear cavity is associated with enhancement suggesting an active infectious or inflammatory process. Underdeveloped right petrous air cells.    2. MRV brain: No evidence of dural sinus thrombosis. Superior sagittal sinus anterior limb is attenuated in caliber but uniform caliber and patent over an entire length. The inferior sagittal sinus is atypically well-developed and patent. This appears to represent developmental variant anatomy. No pathologic flow or occlusion identified.    --- End of Report ---

## 2022-11-02 NOTE — ED CDU PROVIDER SUBSEQUENT DAY NOTE - PROGRESS NOTE DETAILS
CDU NOTE MAI Uiras: pt resting, waxing and waning dizziness. no new complaints. NAD. VSS.  seen by stroke attending Dr. Roe merlos to d/c home from neuro standpoint, no role for ASA if not already on medication, no need to start medications. can f/up outpt.  recommends ENT for abnormal R ear ?infection.   will reach out to ENT for consult.   will, also call PT for eval CDU NOTE MAI Urias: pt was seen by ENT- abnormalities to R ear chronic in nature. epleys maneuver performed. pt states that initially the epleys helped her but epleys maneuver done again and she reports having worsening dizziness and now feels so dizzy doesn't feel comfortable going home.   as per Dr. De León- admit to medicine for intractable dizziness CDU NOTE MAI Urias: pt was seen by ENT- abnormalities to R ear chronic in nature. epleys maneuver performed. pt states that initially the epleys helped her but epleys maneuver done again and she reports having worsening dizziness and now feels so dizzy doesn't feel comfortable going home.   as per Dr. De León- admit to medicine for intractable dizziness    [place]  Jett [phone]

## 2022-11-02 NOTE — PHYSICAL THERAPY INITIAL EVALUATION ADULT - ADDITIONAL COMMENTS
Prior to admission pt reports being independent of all ADL's & functional mobility without AD. Pt resides in apt with spouse, 2 steps to enter, 2 flight to living area. Pt states she has RW, rollator and SAC, shower chair, however does not use. (-) driving, pt uses public transportation. (+) glasses.

## 2022-11-02 NOTE — ED ADULT NURSE REASSESSMENT NOTE - NS ED NURSE REASSESS COMMENT FT1
@1900 Pt received from TASHI De La Cruz. Spoke with English  Octavio Selby #[id number]. Pt oriented to CDU & plan of care was discussed. Pt A&O x 4. Pt admitted waiting for bed. Pt c/o of dizziness denies anything at this time. Pt denies any headache, lightheadedness, numbness, weakness, visual or speech disturbances as of now. On neuro exam, A&O x 4, PERRLA, EOMI,  strength equal, sensation intact, clear speech. V/S stable, pt afebrile,  IV in place, patent and free of signs of infiltration. Pt resting in bed. Safety & comfort measures maintained. Call bell in reach. Will continue to monitor.    @2115 Report given to TASHI Anne. AO4. VSS as per flowsheet. Pt denies pain. Safety maintained. @1900 Pt received from TASHI De La Cruz. Spoke with Greek  Octavio Selby #[id number]. Pt oriented to CDU & plan of care was discussed. Pt A&O x 4. Pt admitted waiting for bed. Pt c/o of dizziness denies anything at this time. Pt denies any headache, lightheadedness, numbness, weakness, visual or speech disturbances as of now. On neuro exam, A&O x 4, PERRLA, EOMI,  strength equal, sensation intact, clear speech. V/S stable, pt afebrile,  IV in place, patent and free of signs of infiltration. Pt resting in bed. Safety & comfort measures maintained. Call bell in reach. Will continue to monitor.    @2115 Report given to TASHI Anne. AO4. VSS as per flowsheet BP 97/65. Pt denies pain. Safety maintained.

## 2022-11-02 NOTE — ED ADULT NURSE REASSESSMENT NOTE - NS ED NURSE REASSESS COMMENT FT1
07.00 Am Received the Pt from  TASHI Ko . Pt is Observed for Vertigo awaitng MRI results . Received the Pt A&OX 4 obeys commands Juanita N/V/D fever chills cp SOB   Comfort care & safety measures continued  IV site looks clean & dry no signs of infiltration noted pt denies  pain IV site .  Pt is advised to call for help  call bell with in the reach pt verbalized the understanding .  pending CDU  MD troy . GCS 15/15 A&OX 4 PERRLA  size 3 Strong upper & lower extremities steady gait with walker    No facial droop  No Hand Leg drop denies numbness tingling Pt continues to have vertigo with nausea  Continue to monitor

## 2022-11-02 NOTE — H&P ADULT - HISTORY OF PRESENT ILLNESS
72y (1950) woman with a PMHx significant for asthma and hypothyroidism presented with ten days of room spinning dizziness. She awoke with dizziness and went to a [facility] for treatment. She was given meclizine but after two days, did not have any relief of her dizziness. 5 days ago, she went back to Urgent care and was told that she may have had a stroke. She was recommended to schedule an MRI of her head outpatient but experienced another bout of room spinning dizziness this morning and henceforth was BIBEMS.     In describing the dizziness, the patient said that they were more intense when she were to lie down and would be associated with nausea and vomiting. If she were to be upright and sitting, the symptoms would go away. While the symptoms would be on and off, she believes that the dizziness is more intense at night. She started getting a mild headache two days ago localized in the left side of her head. She also said that a few days ago, she experienced water leaking out of the left side of the mouth and feels weaker on the left side of her body, particularly when she is clenching her fist. Had a history of gallbladder removal and she takes synthroid for "low blood pressure.

## 2022-11-02 NOTE — ED CDU PROVIDER SUBSEQUENT DAY NOTE - ATTENDING APP SHARED VISIT CONTRIBUTION OF CARE
Elmo De León MD:   I personally saw the patient and performed a substantive portion of the visit including all aspects of the medical decision making.    MDM: 72-year-old female with history of asthma, hypothyroidism who presents with vertiginous symptoms of room spinning dizziness for 10 days, but worsened this morning and resulted in a fall.  Patient also with left-sided numbness and weakness.  In the ED, stroke code was called, CT and CTA head/neck showed no acute infarcts, but there is abnormality of the sagittal sinus.  Patient was seen by neurology who recommended to place patient in CDU MRI, neurochecks, telemetry, frequent reassessment.  I saw the patient in the morning and she continued to have intermittent episodes of dizziness.  Neuro examination shows mild rightward beating horizontal nystagmus that is fatigable, and also has 4+ out of 5 strength in the left upper and left lower extremity.  Review of the MRI shows no acute infarct, but there is right mastoid effusion suggestive infectious versus inflammatory process.  MRV showed no evidence of dural sinus thrombosis.  Pending ENT and neuro recommendations. Elmo De León MD:   I personally saw the patient and performed a substantive portion of the visit including all aspects of the medical decision making.    MDM: 72-year-old female with history of asthma, hypothyroidism who presents with vertiginous symptoms of room spinning dizziness for 10 days, but worsened this morning and resulted in a fall.  Patient also with left-sided numbness and weakness.  In the ED, stroke code was called, CT and CTA head/neck showed no acute infarcts, but there is abnormality of the sagittal sinus.  Patient was seen by neurology who recommended to place patient in CDU MRI, neurochecks, telemetry, frequent reassessment.  I saw the patient in the morning and she continued to have intermittent episodes of dizziness.  Neuro examination shows mild rightward beating horizontal nystagmus that is fatigable, and also has 4+ out of 5 strength in the left upper and left lower extremity.  Review of the MRI shows no acute infarct, but there is right mastoid effusion suggestive infectious versus inflammatory process.  MRV showed no evidence of dural sinus thrombosis.  ENT and neuro recommendations appreciated. Persistent symptoms despite multiple rounds of mediations and therapies. The patient will need to be admitted to the hospital for continued evaluation and management, as well as to optimize medical management and to provide outpatient needs assessment.  Discussed with the accepting physician regarding the initial presentation, diagnostic studies, treatments given in the ED, and current plan of care.   The patient was accepted by and endorsed to the medicine team.

## 2022-11-02 NOTE — H&P ADULT - NSHPLABSRESULTS_GEN_ALL_CORE
Lab Results:  CBC  CBC Full  -  ( 2022 08:27 )  WBC Count : 4.91 K/uL  RBC Count : 4.04 M/uL  Hemoglobin : 12.7 g/dL  Hematocrit : 40.1 %  Platelet Count - Automated : 135 K/uL  Mean Cell Volume : 99.3 fl  Mean Cell Hemoglobin : 31.4 pg  Mean Cell Hemoglobin Concentration : 31.7 gm/dL  Auto Neutrophil # : 3.36 K/uL  Auto Lymphocyte # : 1.11 K/uL  Auto Monocyte # : 0.34 K/uL  Auto Eosinophil # : 0.05 K/uL  Auto Basophil # : 0.03 K/uL  Auto Neutrophil % : 68.5 %  Auto Lymphocyte % : 22.6 %  Auto Monocyte % : 6.9 %  Auto Eosinophil % : 1.0 %  Auto Basophil % : 0.6 %    .		Differential:	[] Automated		[] Manual  Chemistry                        12.7   4.91  )-----------( 135      ( 2022 08:27 )             40.1     11    143  |  105  |  14  ----------------------------<  98  4.7   |  28  |  0.60    Ca    9.7      2022 08:27    TPro  7.4  /  Alb  4.5  /  TBili  0.4  /  DBili  x   /  AST  19  /  ALT  12  /  AlkPhos  87  11    LIVER FUNCTIONS - ( 2022 08:27 )  Alb: 4.5 g/dL / Pro: 7.4 g/dL / ALK PHOS: 87 U/L / ALT: 12 U/L / AST: 19 U/L / GGT: x           PT/INR - ( 2022 08:27 )   PT: 11.2 sec;   INR: 0.97 ratio         PTT - ( 2022 08:27 )  PTT:28.6 sec  Urinalysis Basic - ( 2022 07:48 )    Color: Colorless / Appearance: Clear / S.045 / pH: x  Gluc: x / Ketone: Negative  / Bili: Negative / Urobili: Negative   Blood: x / Protein: Negative / Nitrite: Negative   Leuk Esterase: Negative / RBC: 2 /hpf / WBC 0 /HPF   Sq Epi: x / Non Sq Epi: 0 /hpf / Bacteria: Negative            MICROBIOLOGY/CULTURES:      RADIOLOGY RESULTS: reviewed

## 2022-11-02 NOTE — ED CDU PROVIDER SUBSEQUENT DAY NOTE - HISTORY
[id number]  Pt seen at bedside in NAD, resting comfortably w/o new or evolving complaints. VSS. Meclizine given earlier for dizziness. MR/MRV performed, results pending. Neuro to see in AM.

## 2022-11-03 VITALS
DIASTOLIC BLOOD PRESSURE: 64 MMHG | RESPIRATION RATE: 18 BRPM | TEMPERATURE: 98 F | OXYGEN SATURATION: 95 % | SYSTOLIC BLOOD PRESSURE: 91 MMHG | HEART RATE: 65 BPM

## 2022-11-03 LAB
HCV AB S/CO SERPL IA: 0.14 S/CO — SIGNIFICANT CHANGE UP (ref 0–0.99)
HCV AB SERPL-IMP: SIGNIFICANT CHANGE UP

## 2022-11-03 PROCEDURE — 70553 MRI BRAIN STEM W/O & W/DYE: CPT | Mod: MC

## 2022-11-03 PROCEDURE — 85730 THROMBOPLASTIN TIME PARTIAL: CPT

## 2022-11-03 PROCEDURE — 85610 PROTHROMBIN TIME: CPT

## 2022-11-03 PROCEDURE — 70498 CT ANGIOGRAPHY NECK: CPT | Mod: MA

## 2022-11-03 PROCEDURE — 80053 COMPREHEN METABOLIC PANEL: CPT

## 2022-11-03 PROCEDURE — 70496 CT ANGIOGRAPHY HEAD: CPT | Mod: MA

## 2022-11-03 PROCEDURE — 84484 ASSAY OF TROPONIN QUANT: CPT

## 2022-11-03 PROCEDURE — 99285 EMERGENCY DEPT VISIT HI MDM: CPT

## 2022-11-03 PROCEDURE — 87637 SARSCOV2&INF A&B&RSV AMP PRB: CPT

## 2022-11-03 PROCEDURE — 86803 HEPATITIS C AB TEST: CPT

## 2022-11-03 PROCEDURE — 36415 COLL VENOUS BLD VENIPUNCTURE: CPT

## 2022-11-03 PROCEDURE — 99232 SBSQ HOSP IP/OBS MODERATE 35: CPT

## 2022-11-03 PROCEDURE — 97162 PT EVAL MOD COMPLEX 30 MIN: CPT

## 2022-11-03 PROCEDURE — 80061 LIPID PANEL: CPT

## 2022-11-03 PROCEDURE — 85025 COMPLETE CBC W/AUTO DIFF WBC: CPT

## 2022-11-03 PROCEDURE — 70546 MR ANGIOGRAPH HEAD W/O&W/DYE: CPT | Mod: MC

## 2022-11-03 PROCEDURE — 70450 CT HEAD/BRAIN W/O DYE: CPT | Mod: MA

## 2022-11-03 PROCEDURE — 83036 HEMOGLOBIN GLYCOSYLATED A1C: CPT

## 2022-11-03 PROCEDURE — 81001 URINALYSIS AUTO W/SCOPE: CPT

## 2022-11-03 PROCEDURE — 82962 GLUCOSE BLOOD TEST: CPT

## 2022-11-03 RX ORDER — MECLIZINE HCL 12.5 MG
1 TABLET ORAL
Qty: 0 | Refills: 0 | DISCHARGE

## 2022-11-03 RX ORDER — MECLIZINE HCL 12.5 MG
1 TABLET ORAL
Qty: 28 | Refills: 0
Start: 2022-11-03 | End: 2022-11-09

## 2022-11-03 RX ADMIN — Medication 75 MICROGRAM(S): at 05:28

## 2022-11-03 RX ADMIN — Medication 25 MILLIGRAM(S): at 12:17

## 2022-11-03 RX ADMIN — PANTOPRAZOLE SODIUM 40 MILLIGRAM(S): 20 TABLET, DELAYED RELEASE ORAL at 07:01

## 2022-11-03 NOTE — DISCHARGE NOTE PROVIDER - NSDCMRMEDTOKEN_GEN_ALL_CORE_FT
B Complex 50 oral tablet, extended release: 1 tab(s) orally once a day    NOTE: Unable to confirm current home med. Updated per pharmacy.  Breo Ellipta 100 mcg-25 mcg/inh inhalation powder: 1 puff(s) inhaled once a day    NOTE: Unable to confirm current home med. Updated per pharmacy.  diclofenac 1% topical gel: Apply topically to affected area 3 times a day    NOTE: Unable to confirm current home med. Updated per pharmacy.  levothyroxine 75 mcg (0.075 mg) oral tablet: 1 tab(s) orally once a day    NOTE: Unable to confirm current home med. Updated per pharmacy.  magnesium oxide 400 mg oral tablet: 1 tab(s) orally once a day    NOTE: Unable to confirm current home med. Updated per pharmacy.  meclizine 25 mg oral tablet: 1 tab(s) orally every 12 hours    NOTE: Unable to confirm current home med. Updated per pharmacy.  omeprazole 40 mg oral delayed release capsule: 1 cap(s) orally once a day    NOTE: Unable to confirm current home med. Updated per pharmacy.  Oyster Shell Calcium with Vitamin D 500 mg-5 mcg (200 intl units) oral tablet: 1 tab(s) orally 3 times a day    NOTE: Unable to confirm current home med. Updated per pharmacy.  zolpidem 10 mg oral tablet: 1 tab(s) orally once a day (at bedtime), As Needed    NOTE: Unable to confirm current home med. Updated per pharmacy.   B Complex 50 oral tablet, extended release: 1 tab(s) orally once a day    NOTE: Unable to confirm current home med. Updated per pharmacy.  Breo Ellipta 100 mcg-25 mcg/inh inhalation powder: 1 puff(s) inhaled once a day    NOTE: Unable to confirm current home med. Updated per pharmacy.  diclofenac 1% topical gel: Apply topically to affected area 3 times a day    NOTE: Unable to confirm current home med. Updated per pharmacy.  levothyroxine 75 mcg (0.075 mg) oral tablet: 1 tab(s) orally once a day    NOTE: Unable to confirm current home med. Updated per pharmacy.  magnesium oxide 400 mg oral tablet: 1 tab(s) orally once a day    NOTE: Unable to confirm current home med. Updated per pharmacy.  meclizine 25 mg oral tablet: 1 tab(s) orally every 6 hours, As needed, Dizziness  omeprazole 40 mg oral delayed release capsule: 1 cap(s) orally once a day    NOTE: Unable to confirm current home med. Updated per pharmacy.  Oyster Shell Calcium with Vitamin D 500 mg-5 mcg (200 intl units) oral tablet: 1 tab(s) orally 3 times a day    NOTE: Unable to confirm current home med. Updated per pharmacy.  zolpidem 10 mg oral tablet: 1 tab(s) orally once a day (at bedtime), As Needed    NOTE: Unable to confirm current home med. Updated per pharmacy.

## 2022-11-03 NOTE — CONSULT NOTE ADULT - SUBJECTIVE AND OBJECTIVE BOX
CC: dizziness.     HPI: 72y (1950) woman with a PMHx significant for asthma and hypothyroidism presented with ten days of room spinning dizziness. Seen at [facility] for treatment, given meclizine but after two days, did not have any relief of her dizziness and since has had a few more episodes of dizziness. PT denies any n/v, tinnitus, ear pain, congestion, recent URI, otorrhea, hearing loss, hx of sx or trauma or recent travel.     PAST MEDICAL & SURGICAL HISTORY:  Hypothyroidism      Asthma      S/P cholecystectomy        Allergies    No Known Allergies    Intolerances      MEDICATIONS  (STANDING):  levothyroxine 75 MICROGram(s) Oral daily  pantoprazole    Tablet 40 milliGRAM(s) Oral before breakfast    MEDICATIONS  (PRN):  aluminum hydroxide/magnesium hydroxide/simethicone Suspension 30 milliLiter(s) Oral every 6 hours PRN Dyspepsia  meclizine 25 milliGRAM(s) Oral every 6 hours PRN Dizziness  zolpidem 5 milliGRAM(s) Oral at bedtime PRN Insomnia  zolpidem 5 milliGRAM(s) Oral at bedtime PRN Insomnia      Social History: no tobacco, no etoh     Family history: Pt denies any sign FHx    ROS:   ENT: all negative except as noted in HPI   CV: denies palpitations  Pulm: denies SOB, cough, hemoptysis  GI: denies change in apetite, indigestion, n/v  : denies pertinent urinary symptoms, urgency  Neuro: denies numbness/tingling, loss of sensation  Psych: denies anxiety  MS: denies muscle weakness, instability  Heme: denies easy bruising or bleeding  Endo: denies heat/cold intolerance, excessive sweating  Vascular: denies LE edema    Vital Signs Last 24 Hrs  T(C): 36.8 (03 Nov 2022 12:30), Max: 36.8 (03 Nov 2022 12:30)  T(F): 98.2 (03 Nov 2022 12:30), Max: 98.2 (03 Nov 2022 12:30)  HR: 65 (03 Nov 2022 12:30) (54 - 72)  BP: 91/64 (03 Nov 2022 12:30) (91/64 - 106/72)  BP(mean): --  RR: 18 (03 Nov 2022 12:30) (18 - 18)  SpO2: 95% (03 Nov 2022 12:30) (95% - 98%)    Parameters below as of 03 Nov 2022 12:30  Patient On (Oxygen Delivery Method): room air                  PHYSICAL EXAM:  Gen: NAD  Skin: No rashes, bruises, or lesions  Head: Normocephalic, Atraumatic  Face: no edema, erythema, or fluctuance. Parotid glands soft without mass  Eyes: no scleral injection  Ears: Right - ear canal clear, TM intact without effusion or erythema. No evidence of any fluid drainage. No mastoid tenderness, erythema, or ear bulging            Left - ear canal clear, TM intact without effusion or erythema. No evidence of any fluid drainage. No mastoid tenderness, erythema, or ear bulging  Nose: Nares bilaterally patent, no discharge  Mouth: No Stridor / Drooling / Trismus.  Mucosa moist, tongue/uvula midline, oropharynx clear  Neck: Flat, supple, no lymphadenopathy, trachea midline, no masses  Lymphatic: No lymphadenopathy  Resp: breathing easily, no stridor  CV: no peripheral edema/cyanosis  GI: nondistended   Peripheral vascular: no JVD or edema  Neuro: facial nerve intact, no facial droop          IMAGING/ADDITIONAL STUDIES: < from: CT Head No Cont (11.01.22 @ 08:54) >  MPRESSION:    1.  Brain:  Unremarkable CT appearance of the brain. Right mastoid   effusion and suspected otitis. Underdeveloped right petrous air cells    2.  Right carotid system:    No hemodynamically significant stenosis.    3   Left carotid system:     No hemodynamically significant stenosis.    4.   Vertebral circulation:    Patent.    5.  Anterior intracranial circulation:     Unremarkable.    6.  Posterior intracranial circulation:    Unremarkable.    7.  No large vessel occlusion.    8. Nonenhancement of the superior sagittal sinus anterior limb is found   in association with an enlarged inferior sagittal sinus. This may reflect   developmental variant versus acquired occlusion of the superior sagittal   sinus anterior limb, age indeterminate. Heterogeneous enhancement within   the right sigmoid sinus appears to be inflow/mixing phenomenon rather   than partial thrombosis. Arachnoidgranulation right mid transverse   sinus. Direct comparison to prior intracranial vascular imaging will   benefit this evaluation.  When these examinations become available, an   addendum will be provided as appropriate. Supplemental evaluation by   gadolinium-enhanced MR/MRV may be considered.    < end of copied text >

## 2022-11-03 NOTE — PROGRESS NOTE ADULT - PROBLEM SELECTOR PLAN 1
- Mastoid effusion likely chronic and not infectious  - Meclizine prn   - Vestibular rehab   - Patient should follow up in ENT office as an outpatient. May see Dr. Gayle or Navin or Michael. Call [phone]. - Mastoid effusion likely chronic and not infectious  - Meclizine prn   - Vestibular rehab out pt   - Patient should follow up in ENT office as an outpatient. May see Dr. Gayle or Navin or Michael. Call [phone].

## 2022-11-03 NOTE — DISCHARGE NOTE PROVIDER - PROVIDER TOKENS
PROVIDER:[TOKEN:[[id number]],FOLLOWUP:[1 week]],PROVIDER:[TOKEN:[[id number]],FOLLOWUP:[1 week]] PROVIDER:[TOKEN:[[id number]],FOLLOWUP:[1 week]],PROVIDER:[TOKEN:[[id number]],FOLLOWUP:[1 week]],PROVIDER:[TOKEN:[[id number]],FOLLOWUP:[1-3 days],ESTABLISHEDPATIENT:[T]]

## 2022-11-03 NOTE — DISCHARGE NOTE PROVIDER - NSDCCPCAREPLAN_GEN_ALL_CORE_FT
PRINCIPAL DISCHARGE DIAGNOSIS  Diagnosis: Dizziness  Assessment and Plan of Treatment: dizziness initially improved after Epley maneuver but then worsened, unsteady, and thus admitted to medicine for intractable dizziness.  ENT and neurology were consulted. recommending Meclizine as needed.  Please follow up in ENT office as an outpatient. You may see Dr. Gayle or Dr. Holden or Dr. Rodriguez.  Call [phone] to make an appointment.   Neurology was consulted recommending no further stroke workup needed; outpatient follow up in neurology office is recommended        SECONDARY DISCHARGE DIAGNOSES  Diagnosis: Vertigo  Assessment and Plan of Treatment: Take medicaitions as prescribed   Follow-up with your primary care physician within 1 week. Call for appointment.  Please bring all discharge paperwork and list of medications to all follow up appointments  Please call for follow up appointments one day after discharge       PRINCIPAL DISCHARGE DIAGNOSIS  Diagnosis: Dizziness  Assessment and Plan of Treatment: dizziness initially improved after Epley maneuver but then worsened, unsteady, and thus admitted to medicine for intractable dizziness.  ENT and neurology were consulted. recommending Meclizine as needed.  Please follow up in ENT office as an outpatient. You may see Dr. Gayle or Dr. Holden or Dr. Rodriguez.  Call [phone] to make an appointment.   Neurology was consulted recommending no further stroke workup needed; outpatient follow up in neurology office is recommended        SECONDARY DISCHARGE DIAGNOSES  Diagnosis: Vertigo  Assessment and Plan of Treatment: Take medicaitions as prescribed   Follow-up with your primary care physician within 1 week. Call for appointment.  Please bring all discharge paperwork and list of medications to all follow up appointments  Please call for follow up appointments one day after discharge  also ENT recs  ·  Problem: BPPV (benign paroxysmal positional vertigo).   ·  Plan: - Mastoid effusion likely chronic and not infectious  - Meclizine as needed  - Vestibular rehab out pt   - Patient should follow up in ENT office as an outpatient. May see Dr. Gayle or Navin or Michael. Call [phone].  return if worsens

## 2022-11-03 NOTE — DISCHARGE NOTE NURSING/CASE MANAGEMENT/SOCIAL WORK - PATIENT PORTAL LINK FT
You can access the FollowMyHealth Patient Portal offered by  by registering at the following website: [email or web address]. By joining [facility]’s FollowMyHealth portal, you will also be able to view your health information using other applications (apps) compatible with our system.

## 2022-11-03 NOTE — DISCHARGE NOTE NURSING/CASE MANAGEMENT/SOCIAL WORK - NSTOBACCONEVERSMOKERY/N_GEN_A
Provisional Diagnosis:   Major Depressive Disorder, moderate, without psychotic features    Psychosocial and Contextual Factors:     Aging, pain issues, physical limitations    C-SSRS Summary:      Patient: x  Family: x  Agency:       Present Suicidal Behavior:      Verbal: denies    Attempt:denies    Past Suicidal Behavior:     Verbal: denies    Attempt: denies      Self-Injurious/Self-Mutilation:  None reported    Trauma Identified:    None reported      Protective Factors:    Wife, children, grandchildren    Risk Factors:    Pain issues,  Physical limitations      Clinical Summary:    Patient is a 60year old male, reports history of chronic pain and medical issues and is physically limited in engaging in activities that he enjoys, i.e, working in the yard, gardening. Patient made statement that he doesn't want to live like this anymore. Patient reports he doesn't want to die, he just doesn't want to be in pain. Patient denies plan or intent to harm himself. Patient also worried that family doctor may not continue to prescribed the same pain medication as he has been and is worried that the pain will worsen. Reportedly, patient has been following with family doctor for depression and medication was changed to Cymbalta 3 weeks ago. Reports has had decreased interests and appetite, mainly related to pain, as patient reports he has pain every time he eats. Patient is alert, cooperative, mood depressed. Patient denies hallucinations or delusions. Level of Care Disposition:    Consulted with Dr. Shayy Chacon; recommends outpatient counseling. Provided patient and wife with information for outpatient services.      Insurance Precertification Authorization: No

## 2022-11-03 NOTE — DISCHARGE NOTE PROVIDER - HOSPITAL COURSE
72y (1950) woman with a PMHx significant for asthma and hypothyroidism presented with ten days of room spinning dizziness. She awoke with dizziness and went to a [facility] for treatment. She was given meclizine but after two days, did not have any relief of her dizziness - she went back to Urgent care and was told that she may have had a stroke. She was recommended to schedule an MRI of her head outpatient but experienced another bout of room spinning dizziness morning of 11/1 and henceforth was BIBEMS. Patient was monitored in CDU, and noted with continued severe dizziness -initially improved after Epley maneuver but then worsened, unsteady, and thus admitted to medicine for intractable dizziness.    ENT and neurology were consulted. MRI head demonstrated right mastoid effusion with contiguous involvement of the right middle ear cavity is associated with enhancement suggesting an active infectious or inflammatory process. right TM noted to have a perforation without fluid drainage on ENT examination.  Nasal endoscopy performed which was normal, B/L eustachian tubes widely patent. Per ENT - Mastoid effusion likely chronic and not infectious recommending Meclizine prn, vestibular rehab. Patient should follow up in ENT office as an outpatient. May see Dr. Gayle or Navin or Michael. Advised to Call [phone].     Neurology was consulted recommending no further stroke workup needed; outpatient f/u in neurology office    Discharge/Dispo/Med rec discussed with attending Dr. Bell. Patient medically cleared for discharge outpatient follow up with PCP, ENT, neurology

## 2022-11-03 NOTE — DISCHARGE NOTE PROVIDER - NSCORESITESY/N_GEN_A_CORE_RD
Patient in for port flush and blood draw.  10 cc blood wasted prior to specimen collection.  Specimens collected and sent to lab for processing.   No

## 2022-11-03 NOTE — PROGRESS NOTE ADULT - SUBJECTIVE AND OBJECTIVE BOX
ENT ISSUE/POD: dizziness     HPI: 72y woman with a PMHx significant for asthma and hypothyroidism presented with ten days of room spinning dizziness which lasts for approximately 5 min at a time. MRI head shows right mastoid effusion with contiguous involvement of the right middle ear cavity is associated with enhancement suggesting an active infectious or inflammatory process. Pt c/o today of more question.     PAST MEDICAL & SURGICAL HISTORY:  Hypothyroidism      Asthma      S/P cholecystectomy        Allergies    No Known Allergies    Intolerances      MEDICATIONS  (STANDING):  levothyroxine 75 MICROGram(s) Oral daily  pantoprazole    Tablet 40 milliGRAM(s) Oral before breakfast    MEDICATIONS  (PRN):  aluminum hydroxide/magnesium hydroxide/simethicone Suspension 30 milliLiter(s) Oral every 6 hours PRN Dyspepsia  meclizine 25 milliGRAM(s) Oral every 6 hours PRN Dizziness  zolpidem 5 milliGRAM(s) Oral at bedtime PRN Insomnia  zolpidem 5 milliGRAM(s) Oral at bedtime PRN Insomnia      social history: see consult     family history: see consult   ROS:   ENT: all negative except as noted in HPI   Pulm: denies SOB, cough, hemoptysis  Neuro: denies numbness/tingling, loss of sensation  Endo: denies heat/cold intolerance, excessive sweating      Vital Signs Last 24 Hrs  T(C): 36.8 (03 Nov 2022 12:30), Max: 36.8 (03 Nov 2022 12:30)  T(F): 98.2 (03 Nov 2022 12:30), Max: 98.2 (03 Nov 2022 12:30)  HR: 65 (03 Nov 2022 12:30) (54 - 72)  BP: 91/64 (03 Nov 2022 12:30) (91/64 - 106/72)  BP(mean): --  RR: 18 (03 Nov 2022 12:30) (18 - 18)  SpO2: 95% (03 Nov 2022 12:30) (95% - 98%)    Parameters below as of 03 Nov 2022 12:30  Patient On (Oxygen Delivery Method): room air                  PHYSICAL EXAM:  Gen: NAD  Skin: No rashes, bruises, or lesions  Head: Normocephalic, Atraumatic  Face: no edema, erythema, or fluctuance. Parotid glands soft without mass  Eyes: no scleral injection  Ears: Right - ear canal clear, TM intact without effusion or erythema. No evidence of any fluid drainage. No mastoid tenderness, erythema, or ear bulging            Left - ear canal clear, TM intact without effusion or erythema. No evidence of any fluid drainage. No mastoid tenderness, erythema, or ear bulging  Nose: Nares bilaterally patent, no discharge  Mouth: No Stridor / Drooling / Trismus.  Mucosa moist, tongue/uvula midline, oropharynx clear  Neck: Flat, supple, no lymphadenopathy, trachea midline, no masses  Lymphatic: No lymphadenopathy  Resp: breathing easily, no stridor  Neuro: facial nerve intact, no facial droop        
Neurology Progress Note    S: Patient seen and examined. No new events overnight. patient denied CP, SOB, HA or pain.     Medication:  aluminum hydroxide/magnesium hydroxide/simethicone Suspension 30 milliLiter(s) Oral every 6 hours PRN  levothyroxine 75 MICROGram(s) Oral daily  meclizine 25 milliGRAM(s) Oral every 6 hours PRN  pantoprazole    Tablet 40 milliGRAM(s) Oral before breakfast  zolpidem 5 milliGRAM(s) Oral at bedtime PRN  zolpidem 5 milliGRAM(s) Oral at bedtime PRN      Vitals:  Vital Signs Last 24 Hrs  T(C): 36.1 (03 Nov 2022 05:50), Max: 36.7 (02 Nov 2022 21:54)  T(F): 97 (03 Nov 2022 05:50), Max: 98.1 (02 Nov 2022 21:54)  HR: 54 (03 Nov 2022 05:50) (54 - 72)  BP: 106/72 (03 Nov 2022 05:50) (97/63 - 106/72)  BP(mean): --  RR: 18 (02 Nov 2022 21:54) (18 - 18)  SpO2: 95% (02 Nov 2022 21:54) (95% - 98%)    Parameters below as of 02 Nov 2022 21:54  Patient On (Oxygen Delivery Method): room air        General Exam:   General Appearance: Appropriately dressed and in no acute distress       Head: Normocephalic, atraumatic and no dysmorphic features  Ear, Nose, and Throat: Moist mucous membranes  CVS: S1S2+  Resp: No SOB, no wheeze or rhonchi  Abd: soft NTND  Extremities: No edema, no cyanosis  Skin: No bruises, no rashes     Neurological Exam:  Mental Status: Awake, alert and oriented x 3.  Able to follow simple and complex verbal commands. Able to name and repeat. fluent speech. No obvious aphasia or dysarthria noted.   Cranial Nerves: PERRL, EOMI with L beating nystagmus,  VFFC, sensation V1-V3 intact,  no obvious facial asymmetry , equal elevation of palate, scm/trap 5/5, tongue is midline on protrusion. no obvious papilledema on fundoscopic exam. Hearing is grossly intact.   Motor: Normal bulk, tone and strength throughout. Fine finger movements were intact and symmetric. no tremors or drift noted.    Sensation: Intact to light touch and pinprick throughout. no right/left confusion. no extinction to tactile on DSS. Romberg was negative.   Reflexes: 1+ throughout at biceps, brachioradialis, triceps, patellars and ankles bilaterally and equal. No clonus. R toe and L toe were both downgoing.  Coordination: No dysmetria on FNF or HKS  Gait: unsteady     I personally reviewed the below data/images/labs:        < from: CT Head No Cont (11.01.22 @ 08:54) >    1.  Brain:  Unremarkable CT appearance of the brain. Right mastoid   effusion and suspected otitis. Underdeveloped right petrous air cells    2.  Right carotid system:    No hemodynamically significant stenosis.    3   Left carotid system:     No hemodynamically significant stenosis.    4.   Vertebral circulation:    Patent.    5.  Anterior intracranial circulation:     Unremarkable.    6.  Posterior intracranial circulation:    Unremarkable.    7.  No large vessel occlusion.    8. Nonenhancement of the superior sagittal sinus anterior limb is found   in association with an enlarged inferior sagittal sinus. This may reflect   developmental variant versus acquired occlusion of the superior sagittal   sinus anterior limb, age indeterminate. Heterogeneous enhancement within   the right sigmoid sinus appears to be inflow/mixing phenomenon rather   than partial thrombosis. Arachnoidgranulation right mid transverse   sinus. Direct comparison to prior intracranial vascular imaging will   benefit this evaluation.  When these examinations become available, an   addendum will be provided as appropriate. Supplemental evaluation by   gadolinium-enhanced MR/MRV may be considered.    < end of copied text >    < from: MR Venogram Head w/wo IV Cont (11.01.22 @ 20:51) >    ACC: [id number] EXAM:  MR VENOGRAM BRAIN [facility]                        ACC: [id number] EXAM:  MR BRAIN [facility]                          PROCEDURE DATE:  11/01/2022          INTERPRETATION:  Two examinations were performed on this patient:  1.  MR of thebrain with and without without gadolinium contrast  2.  MR venogram of the brain with and without gadolinium contrast    CLINICAL INFORMATION:   vertigo sxs, L side weakness dizziness, stroke    TECHNIQUE:  1.   MR brain:   Sagittal and axial T1-weighted images, axial FLAIR   images, axial susceptibility weighted images, axial T2-weighted images   and axial diffusion weighted images of the brain were obtained.     Following gadolinium administration 6.0ml cc administered axial   volumetric T1 weighted images were obtained.  This data set was   reconstructed as sagittal and coronal images. Subsequent axial   T1-weighted acquisition was obtained.  2.  MRV brain:   Three-dimensional  time-of-flight MR angiography was   performed in the sagittal plane following administration 6.0ml cc   administered gadolinium.  Post processing angiographic reconstruction of   images was performed.   Each data set was reconstructed as maximum   intensity pixel images and displayed in multiple rotations.  Supplemental   gadolinium-enhanced images were obtained as a volumetric T1-weighted   gradient echo acquisition.  This data set was reconstructed in the   sagittal and coronal planes.    COMPARISON:   CT head and CT angiography from earlier same date available   for review.    FINDINGS:    MR BRAIN:    BRAIN:   The brain demonstrates several small indistinct lesions   scattered within the cerebral hemispheric white matter. These lesions are   hyperintense on the long TR images, otherwise inconspicuous. Involvement   is noted in subcortical and deep white matter. No cerebral cortical   lesion is recognized. With gadolinium administration no pathologic   enhancement occurs.   No diffusion restriction is found in the brain.  No   acute cerebral cortical infarct is found.   No intracranial hemorrhage is   recognized.  No mass effect is found in the brain.    CSF SPACES:   The ventricles, sulci and basal cisterns  appear mildly   dilated reflecting diffuse brain volume loss. No differential brain   volume loss is recognized.   The internal auditory canals appear intact,   without osseous expansion or soft tissue mass lesion.  The 7th and 8th   cranial nerves appear intact, allowing for this technique.   Inner ear   structures appear normally formed.    VESSELS: Intracranial vasculature arterial inflow including the internal   carotid or vertebral arteries demonstrate expected flow voids and   enhancement indicating their patency. The left vertebral artery is   dominant caliber.    HEAD AND NECK STRUCTURES:   The orbits are unremarkable.  Paranasal   sinuses are significant for mucosal opacification of the left maxillary   sinus. Sclerotic wall thickening indicates long-standing disease. The   remaining paranasal sinuses are clear.   Thenasal cavity appears intact.    The central skull base appears intact.  The nasopharynx is symmetric.    The temporal bones appear clear of disease on the left noting   opacification of the mastoid antrum, underdevelopment of petrous air   cells and extension mucosal disease into the middle ear cavity on the   right. At the mastoid antrum and there is mild signal hyperintensity on   diffusion-weighted images. On the postgadolinium images there is   peripheral asymmetric enhancement at the mastoidantrum and along the   walls of the middle ear cavity. The calvarium appears unremarkable.    MRV BRAIN:    The superior sagittal sinus demonstrates a patent intact caliber   posterior limb. Its anterior limb is attenuated in caliber anterior to   the inflow of posterior frontal cerebral veins. The caliber is uniform   but patent throughout its entire length, better demonstrated on the   current examination than CT. The inferior sagittal sinus is unusually   well-developed. The internal cerebral veins, vein of Ayaan and straight   sinus appear intact. The dominant caliber right transverse and sigmoid   sinuses are patent to the right internal jugular vein.  Ovoid   well-circumscribed defect in the right mid transverse sinus near the   inflow of the vein of Darius is typical for arachnoid granulation, noted   to be hyperintense on the long TR images. The small caliber left   transverse and sigmoid sinus are patent to the left internal jugular   vein.   Asymmetry of the transverse sinus caliber is within the limits of   developmental variation.      IMPRESSION:    1.  MR BRAIN:   Unremarkable MR of the brain. Ischemic white matter   disease and atrophy lower range typical for age. No evidence of   infarction.  Right mastoid effusion withcontiguous involvement of the   right middle ear cavity is associated with enhancement suggesting an   active infectious or inflammatory process. Underdeveloped right petrous   air cells.    2.  MRV brain:   No evidence of dural sinus thrombosis.  Superior   sagittal sinus anterior limb is attenuated in caliber but uniform caliber   and patent over an entire length. The inferior sagittal sinus is   atypically well-developed and patent. This appears to represent   developmental variant anatomy. Nopathologic flow or occlusion identified.    --- End of Report ---            MAXIM VAZQUEZ MD; Attending Radiologist  This document has been electronically signed. Nov 2 2022  7:29AM    < end of copied text >              
Patient is a 72y old  Female who presents with a chief complaint of dizziness (03 Nov 2022 09:45)    Date of servie : 11-03-22 @ 12:28  INTERVAL HPI/OVERNIGHT EVENTS:  T(C): 36.1 (11-03-22 @ 05:50), Max: 36.7 (11-02-22 @ 21:54)  HR: 54 (11-03-22 @ 05:50) (54 - 72)  BP: 106/72 (11-03-22 @ 05:50) (97/63 - 106/72)  RR: 18 (11-02-22 @ 21:54) (18 - 18)  SpO2: 95% (11-02-22 @ 21:54) (95% - 98%)  Wt(kg): --  I&O's Summary      LABS:              CAPILLARY BLOOD GLUCOSE                MEDICATIONS  (STANDING):  levothyroxine 75 MICROGram(s) Oral daily  pantoprazole    Tablet 40 milliGRAM(s) Oral before breakfast    MEDICATIONS  (PRN):  aluminum hydroxide/magnesium hydroxide/simethicone Suspension 30 milliLiter(s) Oral every 6 hours PRN Dyspepsia  meclizine 25 milliGRAM(s) Oral every 6 hours PRN Dizziness  zolpidem 5 milliGRAM(s) Oral at bedtime PRN Insomnia  zolpidem 5 milliGRAM(s) Oral at bedtime PRN Insomnia          PHYSICAL EXAM:  GENERAL: NAD, well-groomed, well-developed  HEAD:  Atraumatic, Normocephalic  CHEST/LUNG: Clear to percussion bilaterally; No rales, rhonchi, wheezing, or rubs  HEART: Regular rate and rhythm; No murmurs, rubs, or gallops  ABDOMEN: Soft, Nontender, Nondistended; Bowel sounds present  EXTREMITIES:  2+ Peripheral Pulses, No clubbing, cyanosis, or edema  LYMPH: No lymphadenopathy noted  SKIN: No rashes or lesions    Care Discussed with Consultants/Other Providers [ ] YES  [ ] NO

## 2022-11-03 NOTE — PROGRESS NOTE ADULT - ASSESSMENT
72y (1950) woman with a PMHx significant for asthma and hypothyroidism presented with ten days of room spinning dizziness. She awoke with dizziness and went to a [facility] for treatment. She was given meclizine but after two days, did not have any relief of her dizziness. 5 days ago, she went back to Urgent care and was told that she may have had a stroke. She was recommended to schedule an MRI of her head outpatient but experienced another bout of room spinning dizziness this morning and henceforth was BIBEMS    1 dizziness  - likely sec BPPV  - ENT and neuro fu   - cw meclizine  - fall precautions  - PT     2 Hypothyroid  - cw synthroid    PT and dc planing 
72y R-H woman with  asthma , BPPV and hypothyroidism presented with ten days of room spinning dizziness.    CTH no infarct  CTA H/N question of venous thrombosis  MRI/V unremarkable. venous system and arterial system unremarkable.  R mastoid effusion noted     IMPRESSION   vertigo 2/2 R mastoid effusion      RECOMMENDATION  - mecizline PRN  - ENT, may need abx?  - no further stroke workup needed  - vestibular therapy, PT  - outpatient f/u in office    - check FS, glucose control <180  - GI/DVT ppx  - Thank you for allowing me to participate in the care of this patient. Call with questions.   - spoke with primary team  Lauro Story MD  Vascular Neurology  Office: [phone]. 
72y woman with a PMHx significant for asthma and hypothyroidism presented with ten days of room spinning dizziness which lasts for approximately 5 min at a time. MRI head shows right mastoid effusion with contiguous involvement of the right middle ear cavity is associated with enhancement suggesting an active infectious or inflammatory process. On exam from 11/3, right EAC with minimal cerumen which was removed with a curette and small amount of granulation tissue noted in right EAC, right TM noted to have a perforation without fluid drainage. Candido-hallpike performed which was positive on the right side with horizontal nystagmus and increased vertigo. Epley maneuver performed x3, minimal improvement in vertigo. Nasal endoscopy performed which was normal, B/L eustachian tubes widely patent.

## 2022-11-03 NOTE — DISCHARGE NOTE PROVIDER - CARE PROVIDERS DIRECT ADDRESSES
,[email or web address],DirectAddress_Unknown ,[email or web address],DirectAddress_Unknown,DirectAddress_Unknown

## 2022-11-03 NOTE — DISCHARGE NOTE PROVIDER - CARE PROVIDER_API CALL
Yaritza Gayle)  Otolaryngology  [address] [place] [address], [address]  [place] [address]  Phone: [phone]  Fax: [phone]  Follow Up Time: 1 week    Lauro Story)  Neurology; Vascular Neurology  [address] [facility], [address]  [place] [address]  Phone: [phone]  Fax: [phone]  Follow Up Time: 1 week   Yaritza Gayle)  Otolaryngology  [address] [place] [address], [address]  [place] [address]  Phone: [phone]  Fax: [phone]  Follow Up Time: 1 week    Lauro Story)  Neurology; Vascular Neurology  [address] [facility], [address]  [place] [address]  Phone: [phone]  Fax: [phone]  Follow Up Time: 1 week    Justice Garcia  HEMATOLOGY  [address] [place] [address]  Phone: [phone]  Fax: [phone]  Established Patient  Follow Up Time: 1-3 days

## 2022-11-03 NOTE — DISCHARGE NOTE PROVIDER - NSDCFUADDAPPT_GEN_ALL_CORE_FT
APPTS ARE READY TO BE MADE: [X] YES    Best Family or Patient Contact (if needed):    Additional Information about above appointments (if needed):    1: May see Dr. Gayle or Dr. Holden or Dr. Rodriguez (otolaryngoloy) Call [phone]  2:   3:     Other comments or requests:    APPTS ARE READY TO BE MADE: [X] YES    Best Family or Patient Contact (if needed):    Additional Information about above appointments (if needed):    1: May see Dr. Gayle or Dr. Holden or Dr. Rodriguez (otolaryngoloy) Call [phone]  2: PCP Dr Garcia  3: neuro    Other comments or requests:    APPTS ARE READY TO BE MADE: [X] YES    Best Family or Patient Contact (if needed):    Additional Information about above appointments (if needed):    1: May see Dr. Gayle or Dr. Holden or Dr. Rodriguez (otolaryngoloy) Call [phone]  2: PCP Dr Garcia  3: neuro    Other comments or requests:   Patient advised they currently have a otolaryngologist that they will follow up with. Dr Fabien Anthony.  							 		                                                   Patient was previously scheduled with Dr. Justice Garcia on 11/18. APPTS ARE READY TO BE MADE: [X] YES    Best Family or Patient Contact (if needed):    Additional Information about above appointments (if needed):    1: May see Dr. Gayle or Dr. Holden or Dr. Rodriguez (otolaryngoloy) Call [phone]  2: PCP Dr Garcia  3: neuro    Other comments or requests:   Patient advised they currently have a otolaryngologist that they will follow up with on 11/07 Dr Fabien Anthony.  							 		                                                   Patient was previously scheduled with Dr. Justice Garcia on 11/18.												                                     Patient was provided with follow up request details for Dr. Lauro Story and was advised to call to schedule follow up within specified time frame.

## 2022-11-03 NOTE — DISCHARGE NOTE NURSING/CASE MANAGEMENT/SOCIAL WORK - NSDCPEFALRISK_GEN_ALL_CORE
For information on Fall & Injury Prevention, visit: [email or web address] OR  [email or web address] OR  https://www.cdc.gov/steadi/patient.html

## 2022-11-07 PROBLEM — Z00.00 ENCOUNTER FOR PREVENTIVE HEALTH EXAMINATION: Status: ACTIVE | Noted: 2022-11-07

## 2022-11-17 NOTE — ED ADULT NURSE NOTE - FINAL NURSING ELECTRONIC SIGNATURE
Admission Reconciliation is Completed  Discharge Reconciliation is Not Complete Admission Reconciliation is Completed  Discharge Reconciliation is Completed 01-Nov-2022 15:18 02-Nov-2022 21:26

## 2023-08-16 NOTE — ED ADULT NURSE REASSESSMENT NOTE - NURSING NEURO LEVEL OF CONSCIOUSNESS
Addended by: ADOLFO HERNANDEZ on: 8/16/2023 11:42 AM     Modules accepted: Orders    
alert and awake

## 2024-09-17 NOTE — PATIENT PROFILE ADULT - NSPROMEDSBROUGHTTOHOSP_GEN_A_NUR
oriented to person, place and time , normal sensation , short and long term memory intact
Detail Level: Detailed
yes